# Patient Record
Sex: MALE | Race: ASIAN | NOT HISPANIC OR LATINO
[De-identification: names, ages, dates, MRNs, and addresses within clinical notes are randomized per-mention and may not be internally consistent; named-entity substitution may affect disease eponyms.]

---

## 2017-02-21 PROBLEM — Z00.00 ENCOUNTER FOR PREVENTIVE HEALTH EXAMINATION: Status: ACTIVE | Noted: 2017-02-21

## 2017-03-22 ENCOUNTER — APPOINTMENT (OUTPATIENT)
Dept: OPHTHALMOLOGY | Facility: CLINIC | Age: 37
End: 2017-03-22

## 2017-09-20 ENCOUNTER — APPOINTMENT (OUTPATIENT)
Dept: OPHTHALMOLOGY | Facility: CLINIC | Age: 37
End: 2017-09-20
Payer: MEDICAID

## 2017-09-20 PROCEDURE — 92015 DETERMINE REFRACTIVE STATE: CPT

## 2017-09-20 PROCEDURE — 92226: CPT | Mod: RT

## 2017-09-20 PROCEDURE — 92286 ANT SGM IMG I&R SPECLR MIC: CPT

## 2017-09-20 PROCEDURE — 92014 COMPRE OPH EXAM EST PT 1/>: CPT

## 2017-12-20 ENCOUNTER — APPOINTMENT (OUTPATIENT)
Dept: OPHTHALMOLOGY | Facility: CLINIC | Age: 37
End: 2017-12-20
Payer: MEDICAID

## 2017-12-20 PROCEDURE — 76514 ECHO EXAM OF EYE THICKNESS: CPT

## 2017-12-20 PROCEDURE — 92133 CPTRZD OPH DX IMG PST SGM ON: CPT

## 2017-12-20 PROCEDURE — 92083 EXTENDED VISUAL FIELD XM: CPT

## 2017-12-20 PROCEDURE — 92012 INTRM OPH EXAM EST PATIENT: CPT

## 2018-06-20 ENCOUNTER — APPOINTMENT (OUTPATIENT)
Dept: OPHTHALMOLOGY | Facility: CLINIC | Age: 38
End: 2018-06-20
Payer: MEDICAID

## 2018-06-20 PROCEDURE — 92014 COMPRE OPH EXAM EST PT 1/>: CPT

## 2018-06-20 PROCEDURE — 92226: CPT | Mod: LT

## 2018-06-20 PROCEDURE — 92134 CPTRZ OPH DX IMG PST SGM RTA: CPT

## 2018-07-30 ENCOUNTER — APPOINTMENT (OUTPATIENT)
Dept: VASCULAR SURGERY | Facility: CLINIC | Age: 38
End: 2018-07-30
Payer: MEDICAID

## 2018-07-30 VITALS
WEIGHT: 189 LBS | DIASTOLIC BLOOD PRESSURE: 87 MMHG | HEIGHT: 66 IN | SYSTOLIC BLOOD PRESSURE: 159 MMHG | BODY MASS INDEX: 30.37 KG/M2 | HEART RATE: 93 BPM

## 2018-07-30 DIAGNOSIS — Z83.3 FAMILY HISTORY OF DIABETES MELLITUS: ICD-10-CM

## 2018-07-30 DIAGNOSIS — Z86.2 PERSONAL HISTORY OF DISEASES OF THE BLOOD AND BLOOD-FORMING ORGANS AND CERTAIN DISORDERS INVOLVING THE IMMUNE MECHANISM: ICD-10-CM

## 2018-07-30 DIAGNOSIS — Z71.9 COUNSELING, UNSPECIFIED: ICD-10-CM

## 2018-07-30 DIAGNOSIS — Z84.1 FAMILY HISTORY OF DISORDERS OF KIDNEY AND URETER: ICD-10-CM

## 2018-07-30 DIAGNOSIS — Z82.49 FAMILY HISTORY OF ISCHEMIC HEART DISEASE AND OTHER DISEASES OF THE CIRCULATORY SYSTEM: ICD-10-CM

## 2018-07-30 DIAGNOSIS — E11.22 TYPE 2 DIABETES MELLITUS WITH DIABETIC CHRONIC KIDNEY DISEASE: ICD-10-CM

## 2018-07-30 DIAGNOSIS — Z87.891 PERSONAL HISTORY OF NICOTINE DEPENDENCE: ICD-10-CM

## 2018-07-30 DIAGNOSIS — N18.9 CHRONIC KIDNEY DISEASE, UNSPECIFIED: ICD-10-CM

## 2018-07-30 DIAGNOSIS — E11.311 TYPE 2 DIABETES MELLITUS WITH UNSPECIFIED DIABETIC RETINOPATHY WITH MACULAR EDEMA: ICD-10-CM

## 2018-07-30 DIAGNOSIS — Z86.39 PERSONAL HISTORY OF OTHER ENDOCRINE, NUTRITIONAL AND METABOLIC DISEASE: ICD-10-CM

## 2018-07-30 DIAGNOSIS — Z86.79 PERSONAL HISTORY OF OTHER DISEASES OF THE CIRCULATORY SYSTEM: ICD-10-CM

## 2018-07-30 DIAGNOSIS — N18.4 TYPE 2 DIABETES MELLITUS WITH DIABETIC CHRONIC KIDNEY DISEASE: ICD-10-CM

## 2018-07-30 PROCEDURE — 93971 EXTREMITY STUDY: CPT

## 2018-07-30 PROCEDURE — 99204 OFFICE O/P NEW MOD 45 MIN: CPT

## 2018-07-30 PROCEDURE — G0365: CPT

## 2018-07-30 RX ORDER — FERROUS SULFATE 325(65) MG
325 TABLET ORAL
Refills: 0 | Status: ACTIVE | COMMUNITY

## 2018-07-30 RX ORDER — FUROSEMIDE 40 MG/1
40 TABLET ORAL
Refills: 0 | Status: ACTIVE | COMMUNITY

## 2018-07-30 RX ORDER — ALLOPURINOL 100 MG/1
100 TABLET ORAL
Refills: 0 | Status: ACTIVE | COMMUNITY

## 2018-07-30 RX ORDER — INSULIN GLULISINE 100 [IU]/ML
100 INJECTION, SOLUTION SUBCUTANEOUS
Refills: 0 | Status: ACTIVE | COMMUNITY

## 2018-07-30 RX ORDER — INSULIN DEGLUDEC INJECTION 200 U/ML
200 INJECTION, SOLUTION SUBCUTANEOUS
Refills: 0 | Status: ACTIVE | COMMUNITY

## 2018-07-30 RX ORDER — ATORVASTATIN CALCIUM 40 MG/1
40 TABLET, FILM COATED ORAL
Refills: 0 | Status: ACTIVE | COMMUNITY

## 2018-07-30 RX ORDER — AMLODIPINE BESYLATE 10 MG/1
10 TABLET ORAL
Refills: 0 | Status: ACTIVE | COMMUNITY

## 2018-07-30 RX ORDER — HYDRALAZINE HYDROCHLORIDE 50 MG/1
50 TABLET ORAL
Refills: 0 | Status: ACTIVE | COMMUNITY

## 2018-08-20 ENCOUNTER — OUTPATIENT (OUTPATIENT)
Dept: OUTPATIENT SERVICES | Facility: HOSPITAL | Age: 38
LOS: 1 days | End: 2018-08-20
Payer: MEDICAID

## 2018-08-20 VITALS
HEART RATE: 85 BPM | RESPIRATION RATE: 18 BRPM | TEMPERATURE: 98 F | SYSTOLIC BLOOD PRESSURE: 141 MMHG | DIASTOLIC BLOOD PRESSURE: 82 MMHG | WEIGHT: 182.98 LBS | HEIGHT: 66 IN | OXYGEN SATURATION: 100 %

## 2018-08-20 DIAGNOSIS — E11.9 TYPE 2 DIABETES MELLITUS WITHOUT COMPLICATIONS: Chronic | ICD-10-CM

## 2018-08-20 DIAGNOSIS — Z98.42 CATARACT EXTRACTION STATUS, LEFT EYE: Chronic | ICD-10-CM

## 2018-08-20 DIAGNOSIS — N18.5 CHRONIC KIDNEY DISEASE, STAGE 5: ICD-10-CM

## 2018-08-20 DIAGNOSIS — E11.9 TYPE 2 DIABETES MELLITUS WITHOUT COMPLICATIONS: ICD-10-CM

## 2018-08-20 DIAGNOSIS — H33.22 SEROUS RETINAL DETACHMENT, LEFT EYE: Chronic | ICD-10-CM

## 2018-08-20 DIAGNOSIS — N20.0 CALCULUS OF KIDNEY: Chronic | ICD-10-CM

## 2018-08-20 DIAGNOSIS — N18.9 CHRONIC KIDNEY DISEASE, UNSPECIFIED: ICD-10-CM

## 2018-08-20 DIAGNOSIS — I10 ESSENTIAL (PRIMARY) HYPERTENSION: ICD-10-CM

## 2018-08-20 PROCEDURE — G0463: CPT

## 2018-08-20 RX ORDER — SODIUM CHLORIDE 9 MG/ML
3 INJECTION INTRAMUSCULAR; INTRAVENOUS; SUBCUTANEOUS EVERY 8 HOURS
Qty: 0 | Refills: 0 | Status: DISCONTINUED | OUTPATIENT
Start: 2018-08-30 | End: 2018-09-07

## 2018-08-20 NOTE — H&P PST ADULT - PROBLEM SELECTOR PLAN 2
Continue antihypertensive meds and take with sips of water on day of surgery.  Cleared by PCP.  Follow-up with PCP postop for management.

## 2018-08-20 NOTE — H&P PST ADULT - PMH
DM (diabetes mellitus)    HLD (hyperlipidemia)    HTN (hypertension)    Hyperuricemia    Seasonal allergies    Stage 5 chronic kidney disease not on chronic dialysis Anemia of chronic disease    Diabetic macular edema    DM (diabetes mellitus)    HLD (hyperlipidemia)    HTN (hypertension)    Hyperuricemia    Obesity    Seasonal allergies    Stage 5 chronic kidney disease not on chronic dialysis    Subclinical hypothyroidism    TIA (transient ischemic attack)

## 2018-08-20 NOTE — H&P PST ADULT - NSANTHOSAYNRD_GEN_A_CORE
No. SPIKE screening performed.  STOP BANG Legend: 0-2 = LOW Risk; 3-4 = INTERMEDIATE Risk; 5-8 = HIGH Risk

## 2018-08-20 NOTE — H&P PST ADULT - ASSESSMENT
37 yr old male with PMH of HTN, Hyperlipidemia, DM, diabetic neuropathy and retinopathy, obesity, TIA, subclinical hypothyroidism, anemia of chronic disease, hyperuricemia, chronic kidney disease stage 5 presents for right upper radiocephalic arteriovenous fistula creation on 8/30/2018 in anticipation of hemodialysis.

## 2018-08-20 NOTE — H&P PST ADULT - VENOUS THROMBOEMBOLISM BMI
Myself and Dr Self at bedside explaining to the patient about prophylactic medications and possible adverse reactions. Patient verbalizes understanding and denies any questions. The patient is agreeable to having prophylactic medications.      Rosie Brock RN  08/16/18 2640     30 or less

## 2018-08-20 NOTE — H&P PST ADULT - NEGATIVE CARDIOVASCULAR SYMPTOMS
no paroxysmal nocturnal dyspnea/no claudication/no dyspnea on exertion/no palpitations/no orthopnea/no chest pain

## 2018-08-20 NOTE — H&P PST ADULT - RS GEN PE MLT RESP DETAILS PC
no wheezes/clear to auscultation bilaterally/no rhonchi/airway patent/respirations non-labored/normal/breath sounds equal/no chest wall tenderness/no intercostal retractions/no subcutaneous emphysema/no rales/good air movement

## 2018-08-20 NOTE — H&P PST ADULT - PROBLEM SELECTOR PLAN 1
right upper radiocephalic arteriovenous fistula creation on 8/30/2018 in anticipation of hemodialysis.

## 2018-08-20 NOTE — H&P PST ADULT - FAMILY HISTORY
Father  Still living? Unknown  Family history of diabetes mellitus (DM), Age at diagnosis: Age Unknown  Family history of hypertension, Age at diagnosis: Age Unknown  Family history of hyperlipidemia, Age at diagnosis: Age Unknown     Mother  Still living? Unknown  Family history of diabetes mellitus (DM), Age at diagnosis: Age Unknown  Family history of hypertension, Age at diagnosis: Age Unknown  Family history of hyperlipidemia, Age at diagnosis: Age Unknown     Aunt  Still living? No  Family history of diabetes mellitus (DM), Age at diagnosis: Age Unknown  Family history of breast cancer, Age at diagnosis: Age Unknown     Uncle  Still living? Yes, Estimated age: Age Unknown  Family history of diabetes mellitus (DM), Age at diagnosis: Age Unknown     Sibling  Still living? Yes, Estimated age: Age Unknown  Family history of hypertension, Age at diagnosis: Age Unknown  Family history of hyperlipidemia, Age at diagnosis: Age Unknown     Grandparent  Still living? No  Family history of breast cancer, Age at diagnosis: Age Unknown

## 2018-08-20 NOTE — H&P PST ADULT - NEGATIVE GENERAL GENITOURINARY SYMPTOMS
no urine discoloration/no urinary hesitancy/no nocturia/no bladder infections/no dysuria/normal urinary frequency

## 2018-08-20 NOTE — H&P PST ADULT - PSH
Detached retina, left  repair of retina  History of left cataract extraction    Renal stone  insertion of left ureteral stone

## 2018-08-20 NOTE — H&P PST ADULT - NEGATIVE OPHTHALMOLOGIC SYMPTOMS
no discharge L/no pain R/no irritation L/no irritation R/no lacrimation L/no lacrimation R/no pain L/no discharge R/no diplopia/no photophobia

## 2018-08-20 NOTE — H&P PST ADULT - PROBLEM SELECTOR PLAN 3
Continue antidiabetic meds and hold on day of surgery. Follow-up with PCP postop for diabetic management.

## 2018-08-29 ENCOUNTER — TRANSCRIPTION ENCOUNTER (OUTPATIENT)
Age: 38
End: 2018-08-29

## 2018-08-30 ENCOUNTER — APPOINTMENT (OUTPATIENT)
Dept: VASCULAR SURGERY | Facility: HOSPITAL | Age: 38
End: 2018-08-30

## 2018-08-30 ENCOUNTER — OUTPATIENT (OUTPATIENT)
Dept: OUTPATIENT SERVICES | Facility: HOSPITAL | Age: 38
LOS: 1 days | End: 2018-08-30
Payer: MEDICAID

## 2018-08-30 VITALS
DIASTOLIC BLOOD PRESSURE: 77 MMHG | HEIGHT: 66 IN | WEIGHT: 182.98 LBS | SYSTOLIC BLOOD PRESSURE: 123 MMHG | OXYGEN SATURATION: 95 % | HEART RATE: 97 BPM | RESPIRATION RATE: 16 BRPM | TEMPERATURE: 98 F

## 2018-08-30 VITALS
TEMPERATURE: 98 F | RESPIRATION RATE: 14 BRPM | HEART RATE: 92 BPM | OXYGEN SATURATION: 97 % | DIASTOLIC BLOOD PRESSURE: 62 MMHG | SYSTOLIC BLOOD PRESSURE: 134 MMHG

## 2018-08-30 DIAGNOSIS — Z98.42 CATARACT EXTRACTION STATUS, LEFT EYE: Chronic | ICD-10-CM

## 2018-08-30 DIAGNOSIS — N18.9 CHRONIC KIDNEY DISEASE, UNSPECIFIED: ICD-10-CM

## 2018-08-30 DIAGNOSIS — H33.22 SEROUS RETINAL DETACHMENT, LEFT EYE: Chronic | ICD-10-CM

## 2018-08-30 DIAGNOSIS — N20.0 CALCULUS OF KIDNEY: Chronic | ICD-10-CM

## 2018-08-30 LAB
GLUCOSE BLDC GLUCOMTR-MCNC: 104 MG/DL — HIGH (ref 70–99)
GLUCOSE BLDC GLUCOMTR-MCNC: 105 MG/DL — HIGH (ref 70–99)

## 2018-08-30 PROCEDURE — 35321 RECHANNELING OF ARTERY: CPT

## 2018-08-30 PROCEDURE — 82962 GLUCOSE BLOOD TEST: CPT

## 2018-08-30 PROCEDURE — 36820 AV FUSION/FOREARM VEIN: CPT | Mod: 59

## 2018-08-30 PROCEDURE — 36821 AV FUSION DIRECT ANY SITE: CPT | Mod: RT

## 2018-08-30 RX ADMIN — SODIUM CHLORIDE 3 MILLILITER(S): 9 INJECTION INTRAMUSCULAR; INTRAVENOUS; SUBCUTANEOUS at 07:09

## 2018-08-30 NOTE — ASU DISCHARGE PLAN (ADULT/PEDIATRIC). - MEDICATION SUMMARY - MEDICATIONS TO TAKE
I will START or STAY ON the medications listed below when I get home from the hospital:    oxyCODONE-acetaminophen 5 mg-325 mg oral tablet  -- 1 tab(s) by mouth every 6 hours MDD:4  -- Caution federal law prohibits the transfer of this drug to any person other  than the person for whom it was prescribed.  May cause drowsiness.  Alcohol may intensify this effect.  Use care when operating dangerous machinery.  This prescription cannot be refilled.  This product contains acetaminophen.  Do not use  with any other product containing acetaminophen to prevent possible liver damage.  Using more of this medication than prescribed may cause serious breathing problems.    -- Indication: For Pain management    Apidra SoloStar Pen 100 units/mL injectable solution  -- 4-10 unit(s) injectable 3 times a day (before meals)  -- Indication: For Home medication    Tresiba FlexTouch 200 units/mL subcutaneous solution  -- 30 unit(s) subcutaneous once a day (at bedtime)  -- Indication: For Home medication    allopurinol 100 mg oral tablet  -- 1 tab(s) by mouth once a day  -- Indication: For Home medication    Claritin 10 mg oral tablet  -- 1 tab(s) by mouth once a day, As Needed  -- Indication: For Home medication    atorvastatin 40 mg oral tablet  -- 1 tab(s) by mouth once a day  -- Indication: For Home medication    amLODIPine 10 mg oral tablet  -- 1 tab(s) by mouth once a day  -- Indication: For Home medication    furosemide 40 mg oral tablet  -- 1 tab(s) by mouth once a day  -- Indication: For Home medication    ferrous sulfate 325 mg (65 mg elemental iron) oral tablet  -- 1 tab(s) by mouth once a day  -- Indication: For Home medication    Tears Again preserved ophthalmic solution  -- 1 drop(s) to each affected eye 2 times a day  -- Indication: For Home medication    hydrALAZINE 50 mg oral tablet  -- 1 tab(s) by mouth 2 times a day  -- Indication: For Home medication

## 2018-08-30 NOTE — BRIEF OPERATIVE NOTE - PROCEDURE
<<-----Click on this checkbox to enter Procedure Creation of arteriovenous fistula in right upper extremity  08/30/2018    Active  ARMAND

## 2018-08-30 NOTE — ASU PATIENT PROFILE, ADULT - PMH
Anemia of chronic disease    Diabetic macular edema    DM (diabetes mellitus)    HLD (hyperlipidemia)    HTN (hypertension)    Hyperuricemia    Obesity    Seasonal allergies    Stage 5 chronic kidney disease not on chronic dialysis    Subclinical hypothyroidism    TIA (transient ischemic attack)

## 2018-08-30 NOTE — ASU PATIENT PROFILE, ADULT - VISION (WITH CORRECTIVE LENSES IF THE PATIENT USUALLY WEARS THEM):
left eye retina detachment/Partially impaired: cannot see medication labels or newsprint, but can see obstacles in path, and the surrounding layout; can count fingers at arm's length

## 2018-08-30 NOTE — ASU DISCHARGE PLAN (ADULT/PEDIATRIC). - ACTIVITY LEVEL
Regular activity as tolerated no sports/gym/Regular activity as tolerated/no heavy lifting/no exercise

## 2018-09-17 ENCOUNTER — APPOINTMENT (OUTPATIENT)
Dept: VASCULAR SURGERY | Facility: CLINIC | Age: 38
End: 2018-09-17
Payer: MEDICAID

## 2018-09-17 PROBLEM — E79.0 HYPERURICEMIA WITHOUT SIGNS OF INFLAMMATORY ARTHRITIS AND TOPHACEOUS DISEASE: Chronic | Status: ACTIVE | Noted: 2018-08-20

## 2018-09-17 PROBLEM — D63.8 ANEMIA IN OTHER CHRONIC DISEASES CLASSIFIED ELSEWHERE: Chronic | Status: ACTIVE | Noted: 2018-08-20

## 2018-09-17 PROBLEM — E66.9 OBESITY, UNSPECIFIED: Chronic | Status: ACTIVE | Noted: 2018-08-20

## 2018-09-17 PROBLEM — J30.2 OTHER SEASONAL ALLERGIC RHINITIS: Chronic | Status: ACTIVE | Noted: 2018-08-20

## 2018-09-17 PROBLEM — E03.9 HYPOTHYROIDISM, UNSPECIFIED: Chronic | Status: ACTIVE | Noted: 2018-08-20

## 2018-09-17 PROBLEM — G45.9 TRANSIENT CEREBRAL ISCHEMIC ATTACK, UNSPECIFIED: Chronic | Status: ACTIVE | Noted: 2018-08-20

## 2018-09-17 PROBLEM — E11.9 TYPE 2 DIABETES MELLITUS WITHOUT COMPLICATIONS: Chronic | Status: ACTIVE | Noted: 2018-08-20

## 2018-09-17 PROBLEM — E11.311 TYPE 2 DIABETES MELLITUS WITH UNSPECIFIED DIABETIC RETINOPATHY WITH MACULAR EDEMA: Chronic | Status: ACTIVE | Noted: 2018-08-20

## 2018-09-17 PROBLEM — E78.5 HYPERLIPIDEMIA, UNSPECIFIED: Chronic | Status: ACTIVE | Noted: 2018-08-20

## 2018-09-17 PROBLEM — N18.5 CHRONIC KIDNEY DISEASE, STAGE 5: Chronic | Status: ACTIVE | Noted: 2018-08-20

## 2018-09-17 PROBLEM — I10 ESSENTIAL (PRIMARY) HYPERTENSION: Chronic | Status: ACTIVE | Noted: 2018-08-20

## 2018-09-17 PROCEDURE — 93970 EXTREMITY STUDY: CPT

## 2018-09-17 PROCEDURE — 99024 POSTOP FOLLOW-UP VISIT: CPT

## 2018-09-24 ENCOUNTER — TRANSCRIPTION ENCOUNTER (OUTPATIENT)
Age: 38
End: 2018-09-24

## 2018-10-02 ENCOUNTER — FORM ENCOUNTER (OUTPATIENT)
Age: 38
End: 2018-10-02

## 2018-10-03 ENCOUNTER — APPOINTMENT (OUTPATIENT)
Dept: ENDOVASCULAR SURGERY | Facility: CLINIC | Age: 38
End: 2018-10-03
Payer: MEDICAID

## 2018-10-03 PROCEDURE — 36011Z: CUSTOM | Mod: 59

## 2018-10-03 PROCEDURE — 36902Z: CUSTOM | Mod: 78,59

## 2018-10-03 PROCEDURE — 36215Z: CUSTOM | Mod: 78,59

## 2018-10-03 PROCEDURE — 36909Z: CUSTOM

## 2018-10-23 ENCOUNTER — FORM ENCOUNTER (OUTPATIENT)
Age: 38
End: 2018-10-23

## 2018-10-24 ENCOUNTER — APPOINTMENT (OUTPATIENT)
Dept: ENDOVASCULAR SURGERY | Facility: CLINIC | Age: 38
End: 2018-10-24
Payer: MEDICAID

## 2018-10-24 PROCEDURE — 36909Z: CUSTOM

## 2018-10-24 PROCEDURE — 36215Z: CUSTOM | Mod: 78

## 2018-10-24 PROCEDURE — 36011Z: CUSTOM | Mod: RT,59

## 2018-10-24 PROCEDURE — 36902Z: CUSTOM | Mod: 78

## 2018-10-24 PROCEDURE — 76937 US GUIDE VASCULAR ACCESS: CPT

## 2018-11-06 ENCOUNTER — FORM ENCOUNTER (OUTPATIENT)
Age: 38
End: 2018-11-06

## 2018-11-07 ENCOUNTER — APPOINTMENT (OUTPATIENT)
Dept: ENDOVASCULAR SURGERY | Facility: CLINIC | Age: 38
End: 2018-11-07
Payer: MEDICAID

## 2018-11-07 PROCEDURE — 36011Z: CUSTOM | Mod: 59

## 2018-11-07 PROCEDURE — 36215Z: CUSTOM | Mod: 78

## 2018-11-07 PROCEDURE — 36909Z: CUSTOM

## 2018-11-07 PROCEDURE — 36902Z: CUSTOM | Mod: 78

## 2018-11-21 ENCOUNTER — APPOINTMENT (OUTPATIENT)
Dept: ENDOVASCULAR SURGERY | Facility: CLINIC | Age: 38
End: 2018-11-21
Payer: MEDICAID

## 2018-11-21 PROCEDURE — 93990 DOPPLER FLOW TESTING: CPT

## 2018-12-12 ENCOUNTER — APPOINTMENT (OUTPATIENT)
Dept: ENDOVASCULAR SURGERY | Facility: CLINIC | Age: 38
End: 2018-12-12
Payer: MEDICAID

## 2018-12-12 PROCEDURE — 36589 REMOVAL TUNNELED CV CATH: CPT

## 2018-12-19 ENCOUNTER — APPOINTMENT (OUTPATIENT)
Dept: OPHTHALMOLOGY | Facility: CLINIC | Age: 38
End: 2018-12-19
Payer: MEDICAID

## 2018-12-19 PROCEDURE — 92083 EXTENDED VISUAL FIELD XM: CPT

## 2018-12-19 PROCEDURE — 92133 CPTRZD OPH DX IMG PST SGM ON: CPT

## 2018-12-19 PROCEDURE — ZZZZZ: CPT

## 2018-12-19 PROCEDURE — 92226: CPT | Mod: LT

## 2018-12-19 PROCEDURE — 92014 COMPRE OPH EXAM EST PT 1/>: CPT

## 2019-02-11 ENCOUNTER — APPOINTMENT (OUTPATIENT)
Dept: VASCULAR SURGERY | Facility: CLINIC | Age: 39
End: 2019-02-11
Payer: MEDICARE

## 2019-02-11 PROCEDURE — 99213 OFFICE O/P EST LOW 20 MIN: CPT

## 2019-02-11 PROCEDURE — 93990 DOPPLER FLOW TESTING: CPT

## 2019-02-15 NOTE — DISCUSSION/SUMMARY
[FreeTextEntry1] : 39 yo male with history of esrd on hd presents for follow up \par duplex shows patent avf with flow rate of 735\par given no difficulty with hd will continue to monitor pt to follow up in 3 months with repeat duplex

## 2019-02-15 NOTE — PHYSICAL EXAM
[Thrill] : thrill [Hand well perfused] : hand well perfused [Warm Extremities] : warm extremities [2+] : left 2+ [Pulsatile Thrill] : no pulsatile thrill [Aneurysm] : no aneurysm [Bleeding] : no bleeding [Ulcer] : no ulcer [Gangrene] : no gangrene [Normal] : normoactive bowel sounds, soft and nontender, no hepatosplenomegaly or masses appreciated [de-identified] :  strength 5/5 b/l upper extremities [de-identified] : intact

## 2019-02-15 NOTE — HISTORY OF PRESENT ILLNESS
[] : right radiocephalic fistula [FreeTextEntry1] : 37 yo male with history of esrd on hd presents for follow up of left upper extremity avf without any complaints.  pt has been using avf for access and denies any difficulty with hd.  pt denies any bleeding or difficult canulation

## 2019-05-13 ENCOUNTER — APPOINTMENT (OUTPATIENT)
Dept: VASCULAR SURGERY | Facility: CLINIC | Age: 39
End: 2019-05-13
Payer: SELF-PAY

## 2019-05-13 VITALS
SYSTOLIC BLOOD PRESSURE: 164 MMHG | HEIGHT: 66 IN | WEIGHT: 175 LBS | BODY MASS INDEX: 28.12 KG/M2 | DIASTOLIC BLOOD PRESSURE: 95 MMHG | TEMPERATURE: 98 F | HEART RATE: 96 BPM

## 2019-05-13 DIAGNOSIS — I77.0 ARTERIOVENOUS FISTULA, ACQUIRED: ICD-10-CM

## 2019-05-13 PROCEDURE — 93990 DOPPLER FLOW TESTING: CPT

## 2019-05-13 PROCEDURE — 99213 OFFICE O/P EST LOW 20 MIN: CPT

## 2019-05-13 NOTE — PHYSICAL EXAM
[Thrill] : thrill [Pulsatile Thrill] : no pulsatile thrill [Aneurysm] : no aneurysm [Bleeding] : no bleeding [2+] : left 2+ [Normal] : normoactive bowel sounds, soft and nontender, no hepatosplenomegaly or masses appreciated

## 2019-08-12 ENCOUNTER — APPOINTMENT (OUTPATIENT)
Dept: VASCULAR SURGERY | Facility: CLINIC | Age: 39
End: 2019-08-12
Payer: MEDICARE

## 2019-08-12 PROCEDURE — 93990 DOPPLER FLOW TESTING: CPT

## 2019-08-12 PROCEDURE — 99213 OFFICE O/P EST LOW 20 MIN: CPT

## 2019-08-12 NOTE — ASSESSMENT
[FreeTextEntry1] : Patient with renal failure on hemodialysis. AV fistula with mild to moderate stenosis with no difficulty on dialysis . Continue conservative management with followup in 3 months

## 2019-11-11 ENCOUNTER — APPOINTMENT (OUTPATIENT)
Dept: VASCULAR SURGERY | Facility: CLINIC | Age: 39
End: 2019-11-11

## 2019-12-17 ENCOUNTER — FORM ENCOUNTER (OUTPATIENT)
Age: 39
End: 2019-12-17

## 2019-12-18 ENCOUNTER — APPOINTMENT (OUTPATIENT)
Dept: ENDOVASCULAR SURGERY | Facility: CLINIC | Age: 39
End: 2019-12-18
Payer: MEDICARE

## 2019-12-18 VITALS
WEIGHT: 79.7 LBS | TEMPERATURE: 99 F | OXYGEN SATURATION: 97 % | RESPIRATION RATE: 18 BRPM | HEART RATE: 107 BPM | BODY MASS INDEX: 12.86 KG/M2

## 2019-12-18 VITALS — DIASTOLIC BLOOD PRESSURE: 90 MMHG | SYSTOLIC BLOOD PRESSURE: 169 MMHG

## 2019-12-18 VITALS — HEART RATE: 104 BPM | OXYGEN SATURATION: 97 %

## 2019-12-18 PROCEDURE — 36902Z: CUSTOM

## 2019-12-18 PROCEDURE — 36215Z: CUSTOM | Mod: 59

## 2019-12-20 NOTE — PROCEDURE
[Site check for bleeding/hematoma/thrill/bruit] : Site check for bleeding/hematoma/thrill/bruit [Resume diet] : resume diet [Vital signs on admission the q 15 mins x2] : Vital signs on admission the q 15 mins x2 [FreeTextEntry1] : right arm fistula fistulogram/angioplasty

## 2019-12-20 NOTE — HISTORY OF PRESENT ILLNESS
[] : right radiocephalic fistula [FreeTextEntry1] : 8/30/2018 Dr. Balderrama [FreeTextEntry4] : yesterday [FreeTextEntry5] : 2Am [FreeTextEntry6] : Dr. Abdalla

## 2019-12-20 NOTE — PAST MEDICAL HISTORY
[No therapy indicated for cases scheduled for less than one hour] : No therapy indicated for cases scheduled for less than one hour. [Increasing age ( >40 years old)] : Increasing age ( >40 years old) [FreeTextEntry1] : Malignant Hyperthermia Screening Tool and Risk of Bleeding Assessment\par \par Mr. XAVI MEZA denies family history of unexpected death following Anesthesia or Exercise.\par Denies Family history of Malignant Hyperthermia, Muscle or Neuromuscular disorder and High Temperature following exercise.\par \par Mr. XAVI MEZA denies history of Muscle Spasm, Dark or Chocolate - Colored urine and Unanticipated fever immediately following anesthesia or serious exercise. \par Mr. MEZA also denies bleeding tendencies/ Risks of Bleeding.\par

## 2020-03-09 ENCOUNTER — NON-APPOINTMENT (OUTPATIENT)
Age: 40
End: 2020-03-09

## 2020-03-09 ENCOUNTER — APPOINTMENT (OUTPATIENT)
Dept: OPHTHALMOLOGY | Facility: CLINIC | Age: 40
End: 2020-03-09
Payer: MEDICARE

## 2020-03-09 PROCEDURE — 92134 CPTRZ OPH DX IMG PST SGM RTA: CPT

## 2020-03-09 PROCEDURE — 92014 COMPRE OPH EXAM EST PT 1/>: CPT

## 2020-03-16 ENCOUNTER — APPOINTMENT (OUTPATIENT)
Dept: VASCULAR SURGERY | Facility: CLINIC | Age: 40
End: 2020-03-16

## 2021-02-04 ENCOUNTER — INPATIENT (INPATIENT)
Facility: HOSPITAL | Age: 41
LOS: 0 days | Discharge: HOME | End: 2021-02-05
Attending: INTERNAL MEDICINE | Admitting: INTERNAL MEDICINE
Payer: MEDICARE

## 2021-02-04 VITALS
TEMPERATURE: 99 F | HEIGHT: 66 IN | DIASTOLIC BLOOD PRESSURE: 114 MMHG | HEART RATE: 102 BPM | SYSTOLIC BLOOD PRESSURE: 193 MMHG | OXYGEN SATURATION: 98 % | RESPIRATION RATE: 12 BRPM

## 2021-02-04 DIAGNOSIS — N18.6 END STAGE RENAL DISEASE: ICD-10-CM

## 2021-02-04 DIAGNOSIS — Z98.42 CATARACT EXTRACTION STATUS, LEFT EYE: Chronic | ICD-10-CM

## 2021-02-04 DIAGNOSIS — I10 ESSENTIAL (PRIMARY) HYPERTENSION: ICD-10-CM

## 2021-02-04 DIAGNOSIS — R11.2 NAUSEA WITH VOMITING, UNSPECIFIED: ICD-10-CM

## 2021-02-04 DIAGNOSIS — H33.22 SEROUS RETINAL DETACHMENT, LEFT EYE: Chronic | ICD-10-CM

## 2021-02-04 DIAGNOSIS — Z29.9 ENCOUNTER FOR PROPHYLACTIC MEASURES, UNSPECIFIED: ICD-10-CM

## 2021-02-04 DIAGNOSIS — E11.65 TYPE 2 DIABETES MELLITUS WITH HYPERGLYCEMIA: ICD-10-CM

## 2021-02-04 DIAGNOSIS — N20.0 CALCULUS OF KIDNEY: Chronic | ICD-10-CM

## 2021-02-04 DIAGNOSIS — E87.5 HYPERKALEMIA: ICD-10-CM

## 2021-02-04 LAB
ALBUMIN SERPL ELPH-MCNC: 4.4 G/DL — SIGNIFICANT CHANGE UP (ref 3.3–5)
ALP SERPL-CCNC: 34 U/L — LOW (ref 40–120)
ALT FLD-CCNC: 11 U/L — SIGNIFICANT CHANGE UP (ref 4–41)
ANION GAP SERPL CALC-SCNC: 21 MMOL/L — HIGH (ref 7–14)
ANION GAP SERPL CALC-SCNC: 23 MMOL/L — HIGH (ref 7–14)
AST SERPL-CCNC: 41 U/L — HIGH (ref 4–40)
BASOPHILS # BLD AUTO: 0.05 K/UL — SIGNIFICANT CHANGE UP (ref 0–0.2)
BASOPHILS NFR BLD AUTO: 0.6 % — SIGNIFICANT CHANGE UP (ref 0–2)
BILIRUB SERPL-MCNC: 0.3 MG/DL — SIGNIFICANT CHANGE UP (ref 0.2–1.2)
BLOOD GAS VENOUS COMPREHENSIVE RESULT: SIGNIFICANT CHANGE UP
BUN SERPL-MCNC: 89 MG/DL — HIGH (ref 7–23)
BUN SERPL-MCNC: 91 MG/DL — HIGH (ref 7–23)
CALCIUM SERPL-MCNC: 8.9 MG/DL — SIGNIFICANT CHANGE UP (ref 8.4–10.5)
CALCIUM SERPL-MCNC: 9.1 MG/DL — SIGNIFICANT CHANGE UP (ref 8.4–10.5)
CHLORIDE SERPL-SCNC: 94 MMOL/L — LOW (ref 98–107)
CHLORIDE SERPL-SCNC: 94 MMOL/L — LOW (ref 98–107)
CO2 SERPL-SCNC: 19 MMOL/L — LOW (ref 22–31)
CO2 SERPL-SCNC: 19 MMOL/L — LOW (ref 22–31)
CREAT SERPL-MCNC: 17.75 MG/DL — HIGH (ref 0.5–1.3)
CREAT SERPL-MCNC: 18.17 MG/DL — HIGH (ref 0.5–1.3)
DIALYSIS INSTRUMENT RESULT - HEPATITIS B SURFACE ANTIGEN: NEGATIVE — SIGNIFICANT CHANGE UP
EOSINOPHIL # BLD AUTO: 0.3 K/UL — SIGNIFICANT CHANGE UP (ref 0–0.5)
EOSINOPHIL NFR BLD AUTO: 3.5 % — SIGNIFICANT CHANGE UP (ref 0–6)
GLUCOSE BLDC GLUCOMTR-MCNC: 126 MG/DL — HIGH (ref 70–99)
GLUCOSE BLDC GLUCOMTR-MCNC: 129 MG/DL — HIGH (ref 70–99)
GLUCOSE BLDC GLUCOMTR-MCNC: 87 MG/DL — SIGNIFICANT CHANGE UP (ref 70–99)
GLUCOSE BLDC GLUCOMTR-MCNC: 94 MG/DL — SIGNIFICANT CHANGE UP (ref 70–99)
GLUCOSE SERPL-MCNC: 100 MG/DL — HIGH (ref 70–99)
GLUCOSE SERPL-MCNC: 103 MG/DL — HIGH (ref 70–99)
HCT VFR BLD CALC: 28.1 % — LOW (ref 39–50)
HGB BLD-MCNC: 9.5 G/DL — LOW (ref 13–17)
IANC: 5.69 K/UL — SIGNIFICANT CHANGE UP (ref 1.5–8.5)
IMM GRANULOCYTES NFR BLD AUTO: 0.2 % — SIGNIFICANT CHANGE UP (ref 0–1.5)
LYMPHOCYTES # BLD AUTO: 1.65 K/UL — SIGNIFICANT CHANGE UP (ref 1–3.3)
LYMPHOCYTES # BLD AUTO: 19.4 % — SIGNIFICANT CHANGE UP (ref 13–44)
MAGNESIUM SERPL-MCNC: 2.7 MG/DL — HIGH (ref 1.6–2.6)
MCHC RBC-ENTMCNC: 30.4 PG — SIGNIFICANT CHANGE UP (ref 27–34)
MCHC RBC-ENTMCNC: 33.8 GM/DL — SIGNIFICANT CHANGE UP (ref 32–36)
MCV RBC AUTO: 90.1 FL — SIGNIFICANT CHANGE UP (ref 80–100)
MONOCYTES # BLD AUTO: 0.78 K/UL — SIGNIFICANT CHANGE UP (ref 0–0.9)
MONOCYTES NFR BLD AUTO: 9.2 % — SIGNIFICANT CHANGE UP (ref 2–14)
NEUTROPHILS # BLD AUTO: 5.69 K/UL — SIGNIFICANT CHANGE UP (ref 1.8–7.4)
NEUTROPHILS NFR BLD AUTO: 67.1 % — SIGNIFICANT CHANGE UP (ref 43–77)
NRBC # BLD: 0 /100 WBCS — SIGNIFICANT CHANGE UP
NRBC # FLD: 0 K/UL — SIGNIFICANT CHANGE UP
PHOSPHATE SERPL-MCNC: 7.2 MG/DL — HIGH (ref 2.5–4.5)
PLATELET # BLD AUTO: 179 K/UL — SIGNIFICANT CHANGE UP (ref 150–400)
POTASSIUM SERPL-MCNC: 4.4 MMOL/L — SIGNIFICANT CHANGE UP (ref 3.5–5.3)
POTASSIUM SERPL-MCNC: 6.1 MMOL/L — HIGH (ref 3.5–5.3)
POTASSIUM SERPL-SCNC: 4.4 MMOL/L — SIGNIFICANT CHANGE UP (ref 3.5–5.3)
POTASSIUM SERPL-SCNC: 6.1 MMOL/L — HIGH (ref 3.5–5.3)
PROT SERPL-MCNC: 7.7 G/DL — SIGNIFICANT CHANGE UP (ref 6–8.3)
RBC # BLD: 3.12 M/UL — LOW (ref 4.2–5.8)
RBC # FLD: 13.2 % — SIGNIFICANT CHANGE UP (ref 10.3–14.5)
SARS-COV-2 RNA SPEC QL NAA+PROBE: SIGNIFICANT CHANGE UP
SODIUM SERPL-SCNC: 134 MMOL/L — LOW (ref 135–145)
SODIUM SERPL-SCNC: 136 MMOL/L — SIGNIFICANT CHANGE UP (ref 135–145)
WBC # BLD: 8.49 K/UL — SIGNIFICANT CHANGE UP (ref 3.8–10.5)
WBC # FLD AUTO: 8.49 K/UL — SIGNIFICANT CHANGE UP (ref 3.8–10.5)

## 2021-02-04 PROCEDURE — 99223 1ST HOSP IP/OBS HIGH 75: CPT

## 2021-02-04 PROCEDURE — 71046 X-RAY EXAM CHEST 2 VIEWS: CPT | Mod: 26

## 2021-02-04 PROCEDURE — 99284 EMERGENCY DEPT VISIT MOD MDM: CPT

## 2021-02-04 RX ORDER — INSULIN GLULISINE 100 [IU]/ML
10 INJECTION, SOLUTION SUBCUTANEOUS
Qty: 0 | Refills: 0 | DISCHARGE

## 2021-02-04 RX ORDER — INSULIN DEGLUDEC 100 U/ML
30 INJECTION, SOLUTION SUBCUTANEOUS
Qty: 0 | Refills: 0 | DISCHARGE

## 2021-02-04 RX ORDER — HYDRALAZINE HCL 50 MG
50 TABLET ORAL ONCE
Refills: 0 | Status: COMPLETED | OUTPATIENT
Start: 2021-02-04 | End: 2021-02-04

## 2021-02-04 RX ORDER — HYDRALAZINE HCL 50 MG
50 TABLET ORAL
Refills: 0 | Status: DISCONTINUED | OUTPATIENT
Start: 2021-02-04 | End: 2021-02-05

## 2021-02-04 RX ORDER — INSULIN LISPRO 100/ML
VIAL (ML) SUBCUTANEOUS AT BEDTIME
Refills: 0 | Status: DISCONTINUED | OUTPATIENT
Start: 2021-02-04 | End: 2021-02-05

## 2021-02-04 RX ORDER — DEXTROSE 50 % IN WATER 50 %
25 SYRINGE (ML) INTRAVENOUS ONCE
Refills: 0 | Status: DISCONTINUED | OUTPATIENT
Start: 2021-02-04 | End: 2021-02-05

## 2021-02-04 RX ORDER — GLUCAGON INJECTION, SOLUTION 0.5 MG/.1ML
1 INJECTION, SOLUTION SUBCUTANEOUS ONCE
Refills: 0 | Status: DISCONTINUED | OUTPATIENT
Start: 2021-02-04 | End: 2021-02-05

## 2021-02-04 RX ORDER — AMLODIPINE BESYLATE 2.5 MG/1
1 TABLET ORAL
Qty: 0 | Refills: 0 | DISCHARGE

## 2021-02-04 RX ORDER — CALCIUM GLUCONATE 100 MG/ML
1 VIAL (ML) INTRAVENOUS ONCE
Refills: 0 | Status: DISCONTINUED | OUTPATIENT
Start: 2021-02-04 | End: 2021-02-04

## 2021-02-04 RX ORDER — INSULIN LISPRO 100/ML
VIAL (ML) SUBCUTANEOUS
Refills: 0 | Status: DISCONTINUED | OUTPATIENT
Start: 2021-02-04 | End: 2021-02-05

## 2021-02-04 RX ORDER — INSULIN GLARGINE 100 [IU]/ML
20 INJECTION, SOLUTION SUBCUTANEOUS
Qty: 0 | Refills: 0 | DISCHARGE

## 2021-02-04 RX ORDER — AMLODIPINE BESYLATE 2.5 MG/1
10 TABLET ORAL AT BEDTIME
Refills: 0 | Status: DISCONTINUED | OUTPATIENT
Start: 2021-02-04 | End: 2021-02-05

## 2021-02-04 RX ORDER — ALLOPURINOL 300 MG
1 TABLET ORAL
Qty: 0 | Refills: 0 | DISCHARGE

## 2021-02-04 RX ORDER — ATORVASTATIN CALCIUM 80 MG/1
1 TABLET, FILM COATED ORAL
Qty: 0 | Refills: 0 | DISCHARGE

## 2021-02-04 RX ORDER — DEXTROSE 50 % IN WATER 50 %
12.5 SYRINGE (ML) INTRAVENOUS ONCE
Refills: 0 | Status: DISCONTINUED | OUTPATIENT
Start: 2021-02-04 | End: 2021-02-05

## 2021-02-04 RX ORDER — FUROSEMIDE 40 MG
1 TABLET ORAL
Qty: 0 | Refills: 0 | DISCHARGE

## 2021-02-04 RX ORDER — DEXTROSE 50 % IN WATER 50 %
15 SYRINGE (ML) INTRAVENOUS ONCE
Refills: 0 | Status: DISCONTINUED | OUTPATIENT
Start: 2021-02-04 | End: 2021-02-05

## 2021-02-04 RX ORDER — FERROUS SULFATE 325(65) MG
1 TABLET ORAL
Qty: 0 | Refills: 0 | DISCHARGE

## 2021-02-04 RX ORDER — SODIUM POLYSTYRENE SULFONATE 4.1 MEQ/G
15 POWDER, FOR SUSPENSION ORAL ONCE
Refills: 0 | Status: DISCONTINUED | OUTPATIENT
Start: 2021-02-04 | End: 2021-02-04

## 2021-02-04 RX ORDER — INSULIN GLARGINE 100 [IU]/ML
15 INJECTION, SOLUTION SUBCUTANEOUS AT BEDTIME
Refills: 0 | Status: DISCONTINUED | OUTPATIENT
Start: 2021-02-04 | End: 2021-02-05

## 2021-02-04 RX ORDER — LORATADINE 10 MG/1
1 TABLET ORAL
Qty: 0 | Refills: 0 | DISCHARGE

## 2021-02-04 RX ORDER — FERROUS SULFATE 325(65) MG
325 TABLET ORAL DAILY
Refills: 0 | Status: DISCONTINUED | OUTPATIENT
Start: 2021-02-04 | End: 2021-02-05

## 2021-02-04 RX ORDER — SODIUM CHLORIDE 9 MG/ML
1000 INJECTION, SOLUTION INTRAVENOUS
Refills: 0 | Status: DISCONTINUED | OUTPATIENT
Start: 2021-02-04 | End: 2021-02-05

## 2021-02-04 RX ORDER — ATORVASTATIN CALCIUM 80 MG/1
40 TABLET, FILM COATED ORAL AT BEDTIME
Refills: 0 | Status: DISCONTINUED | OUTPATIENT
Start: 2021-02-04 | End: 2021-02-05

## 2021-02-04 RX ORDER — HYDRALAZINE HCL 50 MG
1 TABLET ORAL
Qty: 0 | Refills: 0 | DISCHARGE

## 2021-02-04 RX ORDER — ONDANSETRON 8 MG/1
4 TABLET, FILM COATED ORAL ONCE
Refills: 0 | Status: DISCONTINUED | OUTPATIENT
Start: 2021-02-04 | End: 2021-02-04

## 2021-02-04 RX ORDER — ALLOPURINOL 300 MG
100 TABLET ORAL DAILY
Refills: 0 | Status: DISCONTINUED | OUTPATIENT
Start: 2021-02-04 | End: 2021-02-05

## 2021-02-04 RX ADMIN — Medication 50 MILLIGRAM(S): at 16:57

## 2021-02-04 RX ADMIN — Medication 50 MILLIGRAM(S): at 23:27

## 2021-02-04 RX ADMIN — AMLODIPINE BESYLATE 10 MILLIGRAM(S): 2.5 TABLET ORAL at 16:59

## 2021-02-04 RX ADMIN — INSULIN GLARGINE 15 UNIT(S): 100 INJECTION, SOLUTION SUBCUTANEOUS at 23:28

## 2021-02-04 RX ADMIN — ATORVASTATIN CALCIUM 40 MILLIGRAM(S): 80 TABLET, FILM COATED ORAL at 23:27

## 2021-02-04 NOTE — H&P ADULT - NSICDXFAMILYHX_GEN_ALL_CORE_FT
FAMILY HISTORY:  Father  Still living? Unknown  Family history of diabetes mellitus (DM), Age at diagnosis: Age Unknown  Family history of hyperlipidemia, Age at diagnosis: Age Unknown  Family history of hypertension, Age at diagnosis: Age Unknown    Mother  Still living? Unknown  Family history of diabetes mellitus (DM), Age at diagnosis: Age Unknown  Family history of hyperlipidemia, Age at diagnosis: Age Unknown  Family history of hypertension, Age at diagnosis: Age Unknown    Sibling  Still living? Yes, Estimated age: Age Unknown  Family history of hyperlipidemia, Age at diagnosis: Age Unknown  Family history of hypertension, Age at diagnosis: Age Unknown    Grandparent  Still living? No  Family history of breast cancer, Age at diagnosis: Age Unknown    Aunt  Still living? No  Family history of breast cancer, Age at diagnosis: Age Unknown  Family history of diabetes mellitus (DM), Age at diagnosis: Age Unknown    Uncle  Still living? Yes, Estimated age: Age Unknown  Family history of diabetes mellitus (DM), Age at diagnosis: Age Unknown

## 2021-02-04 NOTE — CONSULT NOTE ADULT - ATTENDING COMMENTS
Rom Mohr MD  [facility]  Office [phone]  Ans Serv [phone] cell - [phone]

## 2021-02-04 NOTE — ED ADULT TRIAGE NOTE - CHIEF COMPLAINT QUOTE
pt went to HD (t/th/s) today and denied treatment for fever 99.8, pt c/o nausea, vomiting and diarrhea. last covid negative dec 21. pt had covid in may 2020. PMH: HTn, DM,left eye blindness

## 2021-02-04 NOTE — H&P ADULT - PROBLEM SELECTOR PLAN 1
- Likely 2/2 poor bp control vs. gastroenteritis.  - Abx exam benign and continue to tolerate diet.  - Continue to monitor sx. No signs of infection at present.  - Check UA.

## 2021-02-04 NOTE — H&P ADULT - NSHPPHYSICALEXAM_GEN_ALL_CORE
T(C): 36.7 (02-04-21 @ 16:57), Max: 37 (02-04-21 @ 10:38)  HR: 95 (02-04-21 @ 16:57) (95 - 102)  BP: 198/85 (02-04-21 @ 16:57) (193/114 - 198/85)  RR: 16 (02-04-21 @ 16:57) (12 - 16)  SpO2: 100% (02-04-21 @ 16:57) (98% - 100%)  Wt(kg): --  GENERAL: NAD, well-developed  HEAD:  Atraumatic, Normocephalic  EYES: EOMI, PERRLA, conjunctiva and sclera clear  NECK: Supple, No JVD  CHEST/LUNG: Clear to auscultation bilaterally; No wheeze  HEART: Regular rate and rhythm; No murmurs, rubs, or gallops  ABDOMEN: Soft, Nontender, Nondistended; Bowel sounds present  EXTREMITIES:  2+ Peripheral Pulses, No clubbing, cyanosis, or edema  PSYCH: AAOx3  NEUROLOGY: non-focal  SKIN: No rashes or lesions

## 2021-02-04 NOTE — H&P ADULT - NSHPLABSRESULTS_GEN_ALL_CORE
(02-04 @ 13:37)                      9.5  8.49 )-----------( 179                 28.1    Neutrophils = 5.69 (67.1%)  Lymphocytes = 1.65 (19.4%)  Eosinophils = 0.30 (3.5%)  Basophils = 0.05 (0.6%)  Monocytes = 0.78 (9.2%)  Bands = --%    02-04    134<L>  |  94<L>  |  89<H>  ----------------------------<  103<H>  6.1<H>   |  19<L>  |  17.75<H>    Ca    9.1      04 Feb 2021 13:37  Phos  7.2     02-04  Mg     2.7     02-04    TPro  7.7  /  Alb  4.4  /  TBili  0.3  /  DBili  x   /  AST  41<H>  /  ALT  11  /  AlkPhos  34<L>  02-04    Venous Blood Gas:  02-04 @ 13:37  7.33/45/47/22/76.7  VBG Lactate: 0.9

## 2021-02-04 NOTE — CONSULT NOTE ADULT - PROBLEM SELECTOR RECOMMENDATION 9
Maintenance HD schedule TTS, last dialyzed on 2/2.  Hyperkalemia noted, but hemolyzed sample.   K on VBG within normal limits.   Regardless, will plan for HD today, with UF goal 3kg, as BP tolerates.  HD consent obtained from pt.   Consider GI consult for chronic N/V.

## 2021-02-04 NOTE — ED ADULT NURSE NOTE - ED STAT RN HANDOFF DETAILS
Report given to ASHLEY Pascual. Pt a&ox4 and ambulatory. Pt respirations even and unlabored. pt abdomen soft nontender nondistended. Pt aware of plan of care. Vital signs as noted, call bell in reach, comfort measures provided, will continue to monitor till transport.

## 2021-02-04 NOTE — ED PROVIDER NOTE - CARE PLAN
Principal Discharge DX:	Hyperkalemia   Principal Discharge DX:	Hyperkalemia  Secondary Diagnosis:	Nausea vomiting and diarrhea

## 2021-02-04 NOTE — ED PROVIDER NOTE - PHYSICAL EXAMINATION
CONSTITUTIONAL: Well-developed; well-nourished; in no acute distress.   SKIN: warm, dry  HEAD: Normocephalic; atraumatic.  EYES: no conjunctival injection.   ENT: No nasal discharge; airway clear.  NECK: Supple; non tender.  CARD: S1, S2 normal; no murmurs, gallops, or rubs. Regular rate and rhythm.   RESP: No wheezes, rales or rhonchi. Good air movement bilaterally.   ABD: soft ntnd, no guarding, no distention, no rigidity.   EXT:  No cyanosis or edema.   NEURO: Alert, oriented, grossly unremarkable  PSYCH: Cooperative, appropriate.

## 2021-02-04 NOTE — ED PROVIDER NOTE - OBJECTIVE STATEMENT
41 y/o M w/ pmhx of DM, HTN, ESRD on HD Tues, Thurs, Sat presents c/o nausea, vomiting and diarrhea. This problem has been going on for the past couple of months according to the patient. States he went to HD today and they sent him to the ED due to the chronic complaints. States his last bowel movement was yesterday. States he is currently nauseous in the ED. Denies any recent fevers, chills, change of smell or taste, chest pain, abd pain, diarrhea or dysuria.

## 2021-02-04 NOTE — H&P ADULT - PROBLEM SELECTOR PLAN 3
- Per discussion with ED resident, nephrology service aware patient is admitted. Will f/u further recommendation.  - HD schedule TTS. Likely need HD later today or early tomorrow.

## 2021-02-04 NOTE — ED ADULT NURSE REASSESSMENT NOTE - NS ED NURSE REASSESS COMMENT FT1
Report given to Dialysis. pt a&ox4 and ambualtory. Pt to go to dialysis then room 543a. Pt aware of plan of care. pt awaiting transport will continue to monitor.

## 2021-02-04 NOTE — H&P ADULT - NSHPREVIEWOFSYSTEMS_GEN_ALL_CORE
CONSTITUTIONAL: No weakness, fevers or chills  EYES/ENT: No visual changes;  No vertigo or throat pain   NECK: No pain or stiffness  RESPIRATORY: No cough, wheezing, hemoptysis; No shortness of breath  CARDIOVASCULAR: No chest pain or palpitations  GASTROINTESTINAL: n/v  GENITOURINARY: No dysuria, frequency or hematuria  NEUROLOGICAL: No numbness or weakness  SKIN: No itching, burning, rashes, or lesions   PSYCH: No Depression, no anxiety  All other review of systems is negative unless indicated above.

## 2021-02-04 NOTE — H&P ADULT - NSICDXPASTMEDICALHX_GEN_ALL_CORE_FT
PAST MEDICAL HISTORY:  Anemia of chronic disease     Diabetic macular edema     DM (diabetes mellitus)     HLD (hyperlipidemia)     HTN (hypertension)     Hyperuricemia     Obesity     Seasonal allergies     Stage 5 chronic kidney disease not on chronic dialysis     Subclinical hypothyroidism     TIA (transient ischemic attack)

## 2021-02-04 NOTE — ED PROVIDER NOTE - CLINICAL SUMMARY MEDICAL DECISION MAKING FREE TEXT BOX
O'Armando DO PGY-1: pt presents from HD center but did not receive HD. c/o nausea, vomiting and diarrhea. Concern for electrolyte abnormalities .

## 2021-02-04 NOTE — CONSULT NOTE ADULT - SUBJECTIVE AND OBJECTIVE BOX
[facility] Consult Note Nephrology - CONSULTATION NOTE - [phone] - Dr Mohr / Dr Delgado / Dr Martin / Dr Alcala / Dr Ruiz / Dr De La Torre / Dr Cross / Dr Ruzi    Patient is a 40y Male with ESRD on HD TTS, HTN, DM a/w N/V. Ongoing complaints of nausea and vomiting for several weeks to months. Seen by GI in past and had EGD, and told it was gastroparesis. Yesterday pt c/o vomiting. When he went to HD unit today for his usual schedule HD session, he was recommended to come to ED instead for w/u. He denies fever or chills or LE swelling, or cough. Denies diarrhea or abd pain. He was last dialyzed on 2/2.      PAST MEDICAL & SURGICAL HISTORY:  Diabetic macular edema    Subclinical hypothyroidism    Obesity    Anemia of chronic disease    TIA (transient ischemic attack)    Hyperuricemia    Seasonal allergies    HLD (hyperlipidemia)    DM (diabetes mellitus)    Stage 5 chronic kidney disease not on chronic dialysis    HTN (hypertension)    Renal stone  insertion of left ureteral stone    Detached retina, left  repair of retina    History of left cataract extraction      Allergies:  No Known Allergies    Home Medications Reviewed  Hospital Medications:   MEDICATIONS  (STANDING):  allopurinol 100 milliGRAM(s) Oral daily  amLODIPine   Tablet 10 milliGRAM(s) Oral at bedtime  atorvastatin 40 milliGRAM(s) Oral at bedtime  calcium gluconate IVPB 1 Gram(s) IV Intermittent once  dextrose 40% Gel 15 Gram(s) Oral once  dextrose 5%. 1000 milliLiter(s) (50 mL/Hr) IV Continuous <Continuous>  dextrose 5%. 1000 milliLiter(s) (100 mL/Hr) IV Continuous <Continuous>  dextrose 50% Injectable 25 Gram(s) IV Push once  dextrose 50% Injectable 12.5 Gram(s) IV Push once  dextrose 50% Injectable 25 Gram(s) IV Push once  ferrous    sulfate 325 milliGRAM(s) Oral daily  glucagon  Injectable 1 milliGRAM(s) IntraMuscular once  hydrALAZINE 50 milliGRAM(s) Oral two times a day  insulin glargine Injectable (LANTUS) 15 Unit(s) SubCutaneous at bedtime  insulin lispro (ADMELOG) corrective regimen sliding scale   SubCutaneous three times a day before meals  insulin lispro (ADMELOG) corrective regimen sliding scale   SubCutaneous at bedtime  sodium polystyrene sulfonate Suspension 15 Gram(s) Oral once    SOCIAL HISTORY:  Denies ETOh,Smoking,   FAMILY HISTORY:  Family history of breast cancer (Aunt)    Family history of breast cancer (Grandparent)    Family history of hyperlipidemia (Father, Mother, Sibling)    Family history of hypertension (Father, Mother, Sibling)    Family history of diabetes mellitus (DM) (Father, Mother, Aunt, Uncle)      REVIEW OF SYSTEMS:  CONSTITUTIONAL: No weakness, fevers or chills  EYES/ENT: No visual changes;  No vertigo or throat pain   NECK: No pain or stiffness  RESPIRATORY: No cough, wheezing, hemoptysis; No shortness of breath  CARDIOVASCULAR: No chest pain or palpitations.  GASTROINTESTINAL: No abdominal or epigastric pain. +nausea, and vomiting. No diarrhea or constipation. No melena or hematochezia.  GENITOURINARY: No dysuria, frequency, foamy urine, urinary urgency, incontinence or hematuria  NEUROLOGICAL: No numbness or weakness  SKIN: No itching, burning, rashes, or lesions   VASCULAR: No bilateral lower extremity edema.   All other review of systems is negative unless indicated above.    VITALS:  T(F): 98.1 (02-04-21 @ 16:57), Max: 98.6 (02-04-21 @ 10:38)  HR: 95 (02-04-21 @ 16:57)  BP: 198/85 (02-04-21 @ 16:57)  RR: 16 (02-04-21 @ 16:57)  SpO2: 100% (02-04-21 @ 16:57)  Wt(kg): --    Height (cm): 167.6 (02-04 @ 10:38)  PHYSICAL EXAM:  Constitutional: NAD  HEENT: anicteric sclera, oropharynx clear, MMM  Neck: No JVD  Respiratory: CTAB, no wheezes, rales or rhonchi  Cardiovascular: S1, S2, RRR  Gastrointestinal: BS+, soft, NT/ND  Extremities: No cyanosis or clubbing. No peripheral edema  Neurological: A/O x 3, no focal deficits  Psychiatric: Normal mood, normal affect  : No CVA tenderness. No turcios.   Skin: No rashes  Vascular Access: UE AV access +thrill and bruit.     LABS:  02-04    134<L>  |  94<L>  |  89<H>  ----------------------------<  103<H>  6.1<H>   |  19<L>  |  17.75<H>    Ca    9.1      04 Feb 2021 13:37  Phos  7.2     02-04  Mg     2.7     02-04    TPro  7.7  /  Alb  4.4  /  TBili  0.3  /  DBili      /  AST  41<H>  /  ALT  11  /  AlkPhos  34<L>  02-04    Creatinine Trend: 17.75 <--                        9.5    8.49  )-----------( 179      ( 04 Feb 2021 13:37 )             28.1     Urine Studies:      RADIOLOGY & ADDITIONAL STUDIES:  < from: Xray Chest 2 Views PA/Lat (02.04.21 @ 12:40) >  IMPRESSION: Clear lungs.    < end of copied text >

## 2021-02-04 NOTE — H&P ADULT - HISTORY OF PRESENT ILLNESS
39 y/o M w/ pmhx of DM, HTN, ESRD on HD Tues, Thurs, Sat presents c/o nausea, vomiting. This problem has been going on for the past couple of months according to the patient. States he went to HD today and they sent him to the ED due to the chronic complaints. Patient is currently nauseous in the ED and he did not take home meds today. Denies any recent fevers, chills, change of smell or taste, chest pain, abd pain, diarrhea or dysuria.

## 2021-02-04 NOTE — ED PROVIDER NOTE - NS ED ROS FT
CONSTITUTIONAL - No fever, No diaphoresis, No weight change  SKIN - No rash  HEMATOLOGIC - No abnormal bleeding or bruising  EYES - No eye pain, No blurred vision  ENT - No change in hearing, No sore throat, No neck pain, No rhinorrhea, No ear pain  RESPIRATORY - No shortness of breath, No cough  CARDIAC -No chest pain, No palpitations  GI - No abdominal pain, +nausea, +vomiting, No diarrhea, No constipation  - No dysuria, no frequency, no hematuria.   MUSCULOSKELETAL - No joint pain, No swelling, No back pain  NEUROLOGIC - No numbness, No focal weakness, No headache, No dizziness

## 2021-02-04 NOTE — ED ADULT NURSE NOTE - OBJECTIVE STATEMENT
Pt. is A&Ox3 presents to ER c/o nausea, vomiting, and diarrhea for a few weeks. Pt. started experiencing SOB this AM, has resolved since. Pt. went to hemodialysis today usually has sessions (T/TH/S), was denies treatment because oral temp was 99.8. Pt. has a PMH of HTN, DM, HTN, and left eye blindness. Noted RFA AV Fistula. Pt. respirations even and unlabored, abdomen nondistended and nontender, skin intact. Safety precautions obtained. Will medicate as ordered.

## 2021-02-04 NOTE — H&P ADULT - BIRTH SEX
[facility] DIABETES PROGRAM  =============================================  Barbie Wayne is a 61 y.o. male enrolled in the [facility] [employer] [facility] Diabetes [facility].      Identified care gap(s): A1c > 8%, medication follow up (Ozempic)    DM Program Prescriptions:  Medication Sig    hydroCHLOROthiazide (HYDRODIURIL) 25 mg tablet Take 1 Tab by mouth daily.  metFORMIN (GLUCOPHAGE) 500 mg tablet Take 1 Tab by mouth two (2) times daily (with meals).  aspirin 81 mg chewable tablet Take 1 Tab by mouth daily.  Blood-Glucose Meter (DueProps Wireless 2 Meter kit) monitoring kit Use as directed to monitor blood sugars    glucose blood VI test strips [employer] Jazz) strip Use to test blood sugar 2 times daily    lancets misc Use to test blood sugar 2 times daily    Blood Glucose Control, High soln Use to complete control solution test    semaglutide (Ozempic) 1 mg/dose (2 mg/1.5 mL) sub-q pen 1 mg by SubCUTAneous route every seven (7) days.  Insulin Needles, Disposable, (PEN NEEDLE) 32 gauge x 5/32\" ndle Use with victoza        Allergies:  No Known Allergies     Labs:  Component      Latest Ref Rng & Units 8/3/2020 4/28/2020 1/22/2020          10:12 AM  4:28 AM 10:10 AM   Hemoglobin A1c, (calculated)      4.2 - 5.6 % 9.0 (H) 10.6 (H) 9.5 (H)     Estimated Creatinine Clearance: 156.9 mL/min (by C-G formula based on SCr of 0.81 mg/dL). Component      Latest Ref Rng & Units 4/29/2020 4/28/2020 4/27/2020           3:00 AM  4:29 AM  9:01 PM   GFR est non-AA      >60 ml/min/1.73m2 >60 58 (L) 51 (L)       Care Team:   - Physician (PCP/Endocrinologist): Kaleb Nolasco MD (Last OV: 8/12/20; Next OV: to be scheduled)    Diabetes Care:  - Glycemic Goal: <7.0%. Is not at blood glucose goal but noted improvement with most recent result.  Current regimen metformin 500 mg BID, Ozempic 1 mg once weekly; Victoza 1.8 mg daily changed to Ozempic 1 mg daily @5/20/20 to assist with adherence - per 7/8/20 pharmacy encounter had not yet changed would would expect he has started now. Previously not adhering to diabetic diet. ? Reduce Pill Wing: n/a  ? Therapy Optimization: can continue to titrate metformin as needed/tolerated; future consideration to initiate SGLT2i with cardiovascular benefit (PVD/CAD per problem list but pt denied history during 5/19/20 [facility] visit?) - Sandra Meeks is covered option. Would like to maximize adherence with GLP-1RA  ? Medication changes since last A1c: Victoza changed to Ozempic @5/20/20 to assist with adherence  ? Medication adherence assessment:  § Metformin: 1/22/20, 5/1/20 x 90 ds (180 tabs) - overdue for refill? § Victoza: 1/22/20, 2/13/20, 5/1/20 x 30 ds (noted some denied claims due to step therapy) - gaps noted, per 5/19/20 [facility] visit pt reported troubles remembering to take daily injection  § Ozempic: 5/21/20 x 28 ds - may be overdue for refill? Per 7/8/20 encounter had @1.5 weeks left of Victoza then planned to switch to Ozempic  § ASA: Last fill 5/21/20 x 90 ds  § HCTZ: LF 6/2/20 x 90 ds  § [employer] Jazz supplies: filled 5/21/20; per 7/8/20 encounter had not yet started    Attempting to reach patient. No answer, LM. Would like to review:   Follow up on current DM regimen & SMBGs   Ozempic tolerability if he has started?  Assist with any refills - metformin, Ozempic, aspirin?      Nadia French, PharmD, [facility] Pharmacist  Dept: [phone] (toll free [phone], option 7) Male

## 2021-02-04 NOTE — H&P ADULT - PROBLEM SELECTOR PLAN 2
- BMP specimen severely hemolyzed on admission. Will recheck. K from VBG slightly elevated at 4.7 no EKG changes.  - Will give calcium gluconate 1gram and lokema.  - Continue to trend BMP.

## 2021-02-05 ENCOUNTER — TRANSCRIPTION ENCOUNTER (OUTPATIENT)
Age: 41
End: 2021-02-05

## 2021-02-05 VITALS
DIASTOLIC BLOOD PRESSURE: 70 MMHG | OXYGEN SATURATION: 100 % | HEART RATE: 92 BPM | RESPIRATION RATE: 18 BRPM | SYSTOLIC BLOOD PRESSURE: 163 MMHG | TEMPERATURE: 99 F

## 2021-02-05 LAB
A1C WITH ESTIMATED AVERAGE GLUCOSE RESULT: 5.3 % — SIGNIFICANT CHANGE UP (ref 4–5.6)
ALBUMIN SERPL ELPH-MCNC: 4.3 G/DL — SIGNIFICANT CHANGE UP (ref 3.3–5)
ALP SERPL-CCNC: 39 U/L — LOW (ref 40–120)
ALT FLD-CCNC: 8 U/L — SIGNIFICANT CHANGE UP (ref 4–41)
ANION GAP SERPL CALC-SCNC: 18 MMOL/L — HIGH (ref 7–14)
APPEARANCE UR: CLEAR — SIGNIFICANT CHANGE UP
AST SERPL-CCNC: 11 U/L — SIGNIFICANT CHANGE UP (ref 4–40)
BACTERIA # UR AUTO: NEGATIVE — SIGNIFICANT CHANGE UP
BILIRUB SERPL-MCNC: 0.4 MG/DL — SIGNIFICANT CHANGE UP (ref 0.2–1.2)
BILIRUB UR-MCNC: NEGATIVE — SIGNIFICANT CHANGE UP
BUN SERPL-MCNC: 52 MG/DL — HIGH (ref 7–23)
CALCIUM SERPL-MCNC: 9.4 MG/DL — SIGNIFICANT CHANGE UP (ref 8.4–10.5)
CHLORIDE SERPL-SCNC: 93 MMOL/L — LOW (ref 98–107)
CO2 SERPL-SCNC: 27 MMOL/L — SIGNIFICANT CHANGE UP (ref 22–31)
COLOR SPEC: SIGNIFICANT CHANGE UP
CREAT SERPL-MCNC: 12.62 MG/DL — HIGH (ref 0.5–1.3)
DIFF PNL FLD: ABNORMAL
EPI CELLS # UR: 2 /HPF — SIGNIFICANT CHANGE UP (ref 0–5)
ESTIMATED AVERAGE GLUCOSE: 105 MG/DL — SIGNIFICANT CHANGE UP (ref 68–114)
GLUCOSE BLDC GLUCOMTR-MCNC: 111 MG/DL — HIGH (ref 70–99)
GLUCOSE BLDC GLUCOMTR-MCNC: 121 MG/DL — HIGH (ref 70–99)
GLUCOSE SERPL-MCNC: 110 MG/DL — HIGH (ref 70–99)
GLUCOSE UR QL: ABNORMAL
HBV CORE AB SER-ACNC: SIGNIFICANT CHANGE UP
HBV SURFACE AB SER-ACNC: >1000 MIU/ML — SIGNIFICANT CHANGE UP
HCT VFR BLD CALC: 28.7 % — LOW (ref 39–50)
HGB BLD-MCNC: 9.6 G/DL — LOW (ref 13–17)
HYALINE CASTS # UR AUTO: 2 /LPF — SIGNIFICANT CHANGE UP (ref 0–7)
KETONES UR-MCNC: NEGATIVE — SIGNIFICANT CHANGE UP
LEUKOCYTE ESTERASE UR-ACNC: NEGATIVE — SIGNIFICANT CHANGE UP
MAGNESIUM SERPL-MCNC: 2.4 MG/DL — SIGNIFICANT CHANGE UP (ref 1.6–2.6)
MCHC RBC-ENTMCNC: 30.2 PG — SIGNIFICANT CHANGE UP (ref 27–34)
MCHC RBC-ENTMCNC: 33.4 GM/DL — SIGNIFICANT CHANGE UP (ref 32–36)
MCV RBC AUTO: 90.3 FL — SIGNIFICANT CHANGE UP (ref 80–100)
MRSA PCR RESULT.: SIGNIFICANT CHANGE UP
NITRITE UR-MCNC: NEGATIVE — SIGNIFICANT CHANGE UP
NRBC # BLD: 0 /100 WBCS — SIGNIFICANT CHANGE UP
NRBC # FLD: 0 K/UL — SIGNIFICANT CHANGE UP
PH UR: 7.5 — SIGNIFICANT CHANGE UP (ref 5–8)
PLATELET # BLD AUTO: 193 K/UL — SIGNIFICANT CHANGE UP (ref 150–400)
POTASSIUM SERPL-MCNC: 3.7 MMOL/L — SIGNIFICANT CHANGE UP (ref 3.5–5.3)
POTASSIUM SERPL-SCNC: 3.7 MMOL/L — SIGNIFICANT CHANGE UP (ref 3.5–5.3)
PROT SERPL-MCNC: 7.4 G/DL — SIGNIFICANT CHANGE UP (ref 6–8.3)
PROT UR-MCNC: ABNORMAL
RBC # BLD: 3.18 M/UL — LOW (ref 4.2–5.8)
RBC # FLD: 13.1 % — SIGNIFICANT CHANGE UP (ref 10.3–14.5)
RBC CASTS # UR COMP ASSIST: 2 /HPF — SIGNIFICANT CHANGE UP (ref 0–4)
S AUREUS DNA NOSE QL NAA+PROBE: SIGNIFICANT CHANGE UP
SODIUM SERPL-SCNC: 138 MMOL/L — SIGNIFICANT CHANGE UP (ref 135–145)
SP GR SPEC: 1.01 — SIGNIFICANT CHANGE UP (ref 1.01–1.02)
UROBILINOGEN FLD QL: SIGNIFICANT CHANGE UP
WBC # BLD: 7.97 K/UL — SIGNIFICANT CHANGE UP (ref 3.8–10.5)
WBC # FLD AUTO: 7.97 K/UL — SIGNIFICANT CHANGE UP (ref 3.8–10.5)
WBC UR QL: 16 /HPF — HIGH (ref 0–5)

## 2021-02-05 RX ORDER — CHLORHEXIDINE GLUCONATE 213 G/1000ML
1 SOLUTION TOPICAL DAILY
Refills: 0 | Status: DISCONTINUED | OUTPATIENT
Start: 2021-02-05 | End: 2021-02-05

## 2021-02-05 RX ADMIN — Medication 50 MILLIGRAM(S): at 04:54

## 2021-02-05 RX ADMIN — Medication 100 MILLIGRAM(S): at 12:31

## 2021-02-05 RX ADMIN — Medication 325 MILLIGRAM(S): at 12:31

## 2021-02-05 RX ADMIN — CHLORHEXIDINE GLUCONATE 1 APPLICATION(S): 213 SOLUTION TOPICAL at 12:31

## 2021-02-05 NOTE — DISCHARGE NOTE NURSING/CASE MANAGEMENT/SOCIAL WORK - NSSCCONTNUM_GEN_ALL_CORE
[facility] Coordinator [phone] for CDPAP Service reinstatement-granted

## 2021-02-05 NOTE — DISCHARGE NOTE NURSING/CASE MANAGEMENT/SOCIAL WORK - NSDCFUADDAPPT_GEN_ALL_CORE_FT
Transportation arranged with [employer] for 5pm pickup via [facility]. Patient and RN are aware.

## 2021-02-05 NOTE — DISCHARGE NOTE PROVIDER - NSDCCPCAREPLAN_GEN_ALL_CORE_FT
PRINCIPAL DISCHARGE DIAGNOSIS  Diagnosis: Nausea vomiting and diarrhea  Assessment and Plan of Treatment: - your symptoms likely from poor blood pressure control vs gastroenteritis  - your abdomenal exam was benign and you tolerate diet well  - No signs of infection at present  - follow up with your PCP as outpatient for further management      SECONDARY DISCHARGE DIAGNOSES  Diagnosis: ESRD (end stage renal disease)  Assessment and Plan of Treatment: - Nephrology consulted- your potassium level was elevated on admission and you were supplemented, also had HD done  - resolved, Potassium normal now  - continue with HD schedule on Tuesday, Thursday, S. Likely need HD later today or early tomorrow    Diagnosis: Essential hypertension  Assessment and Plan of Treatment: - Continue current blood pressure medication regimen as directed. Monitor for any visual changes, headaches or dizziness.  Monitor blood pressure regularly.  Follow up with your PCP for further management for high blood pressure, please call to make appointment within 1 week of discharge    Diagnosis: Type 2 diabetes mellitus with hyperglycemia, with long-term current use of insulin  Assessment and Plan of Treatment: - Monitor finger sticks pre-meal and bedtime, low salt, fat and carbohydrate diet, minimize glucose intake. HgA1C5.3%  - Exercise daily for at least 30 minutes and weight loss.  Follow up with primary care physician and endocrinologist for routine Hemoglobin A1C checks and management   - Follow up with your ophthalmologist for routine yearly vision exams

## 2021-02-05 NOTE — PROGRESS NOTE ADULT - SUBJECTIVE AND OBJECTIVE BOX
[facility] - S Danny / Onur S /D Cari/ S Joseph/ SID Martin/ Ludwig Cross / JAZMYNE Rou/ O Britt  service -[phone], office [phone]  ---------------------------------------------------------------------------------------------------------------    Patient seen and examined bedside    Subjective and Objective: No overnight events, sob resolved. No complaints today. feeling better    Allergies: No Known Allergies      Hospital Medications:   MEDICATIONS  (STANDING):  allopurinol 100 milliGRAM(s) Oral daily  amLODIPine   Tablet 10 milliGRAM(s) Oral at bedtime  atorvastatin 40 milliGRAM(s) Oral at bedtime  chlorhexidine 4% Liquid 1 Application(s) Topical daily  dextrose 40% Gel 15 Gram(s) Oral once  dextrose 5%. 1000 milliLiter(s) (50 mL/Hr) IV Continuous <Continuous>  dextrose 5%. 1000 milliLiter(s) (100 mL/Hr) IV Continuous <Continuous>  dextrose 50% Injectable 25 Gram(s) IV Push once  dextrose 50% Injectable 12.5 Gram(s) IV Push once  dextrose 50% Injectable 25 Gram(s) IV Push once  ferrous    sulfate 325 milliGRAM(s) Oral daily  glucagon  Injectable 1 milliGRAM(s) IntraMuscular once  hydrALAZINE 50 milliGRAM(s) Oral two times a day  insulin glargine Injectable (LANTUS) 15 Unit(s) SubCutaneous at bedtime  insulin lispro (ADMELOG) corrective regimen sliding scale   SubCutaneous three times a day before meals  insulin lispro (ADMELOG) corrective regimen sliding scale   SubCutaneous at bedtime    VITALS:  T(F): 98.7 (21 @ 14:49), Max: 98.7 (21 @ 14:49)  HR: 92 (21 @ 14:49)  BP: 163/70 (21 @ 14:49)  RR: 18 (21 @ 14:49)  SpO2: 100% (21 @ 14:49)  Wt(kg): --    Height (cm): 167.6 ( @ 21:36)  Weight (kg): 68.1 ( @ 21:36)  BMI (kg/m2): 24.2 ( @ 21:36)  BSA (m2): 1.77 ( @ 21:36)    PHYSICAL EXAM:  Constitutional: NAD  HEENT: anicteric sclera  Neck: No JVD  Respiratory: CTAB, no wheezes, rales or rhonchi  Cardiovascular: S1, S2, RRR  Gastrointestinal: BS+, soft, NT/ND  Extremities: No peripheral edema  Neurological: A/O x 3  Psychiatric: Normal mood, normal affect  : No CVA tenderness. No turcios.   Skin: No rashes  Vascular Access: UE AV access +thrill and bruit.     LABS:      138  |  93<L>  |  52<H>  ----------------------------<  110<H>  3.7   |  27  |  12.62<H>    Ca    9.4      2021 06:55  Phos  7.2     -  Mg     2.4         TPro  7.4  /  Alb  4.3  /  TBili  0.4  /  DBili      /  AST  11  /  ALT  8   /  AlkPhos  39<L>  -    Creatinine Trend: 12.62 <--, 18.17 <--, 17.75 <--                        9.6    7.97  )-----------( 193      ( 2021 06:55 )             28.7     Urine Studies:  Urinalysis Basic - ( 2021 06:55 )    Color: Light Yellow / Appearance: Clear / S.012 / pH:   Gluc:  / Ketone: Negative  / Bili: Negative / Urobili: <2 mg/dL   Blood:  / Protein: 300 mg/dL / Nitrite: Negative   Leuk Esterase: Negative / RBC: 2 /HPF / WBC 16 /HPF   Sq Epi:  / Non Sq Epi: 2 /HPF / Bacteria: Negative      RADIOLOGY & ADDITIONAL STUDIES:   [facility] - S Danny / Onur S /D Cari/ S Joseph/ SID Martin/ Ludwig Cross / JAZMYNE Rou/ O Britt  service -[phone], office [phone]  ---------------------------------------------------------------------------------------------------------------    Patient seen and examined bedside    Subjective and Objective: No overnight events, denied sob. No complaints today. feeling better    Allergies: No Known Allergies      Hospital Medications:   MEDICATIONS  (STANDING):  allopurinol 100 milliGRAM(s) Oral daily  amLODIPine   Tablet 10 milliGRAM(s) Oral at bedtime  atorvastatin 40 milliGRAM(s) Oral at bedtime  chlorhexidine 4% Liquid 1 Application(s) Topical daily  dextrose 40% Gel 15 Gram(s) Oral once  dextrose 5%. 1000 milliLiter(s) (50 mL/Hr) IV Continuous <Continuous>  dextrose 5%. 1000 milliLiter(s) (100 mL/Hr) IV Continuous <Continuous>  dextrose 50% Injectable 25 Gram(s) IV Push once  dextrose 50% Injectable 12.5 Gram(s) IV Push once  dextrose 50% Injectable 25 Gram(s) IV Push once  ferrous    sulfate 325 milliGRAM(s) Oral daily  glucagon  Injectable 1 milliGRAM(s) IntraMuscular once  hydrALAZINE 50 milliGRAM(s) Oral two times a day  insulin glargine Injectable (LANTUS) 15 Unit(s) SubCutaneous at bedtime  insulin lispro (ADMELOG) corrective regimen sliding scale   SubCutaneous three times a day before meals  insulin lispro (ADMELOG) corrective regimen sliding scale   SubCutaneous at bedtime    VITALS:  T(F): 98.7 (21 @ 14:49), Max: 98.7 (21 @ 14:49)  HR: 92 (21 @ 14:49)  BP: 163/70 (21 @ 14:49)  RR: 18 (21 @ 14:49)  SpO2: 100% (21 @ 14:49)  Wt(kg): --    Height (cm): 167.6 ( @ 21:36)  Weight (kg): 68.1 ( @ 21:36)  BMI (kg/m2): 24.2 ( @ 21:36)  BSA (m2): 1.77 ( @ 21:36)    PHYSICAL EXAM:  Constitutional: NAD  HEENT: anicteric sclera  Neck: No JVD  Respiratory: CTAB, no wheezes, rales or rhonchi  Cardiovascular: S1, S2, RRR  Gastrointestinal: BS+, soft, NT/ND  Extremities: No peripheral edema  Neurological: A/O x 3  Psychiatric: Normal mood, normal affect  : No turcios.   Skin: No rashes  Vascular Access: UE AV access +thrill and bruit.     LABS:      138  |  93<L>  |  52<H>  ----------------------------<  110<H>  3.7   |  27  |  12.62<H>    Ca    9.4      2021 06:55  Phos  7.2     -  Mg     2.4         TPro  7.4  /  Alb  4.3  /  TBili  0.4  /  DBili      /  AST  11  /  ALT  8   /  AlkPhos  39<L>  02-    Creatinine Trend: 12.62 <--, 18.17 <--, 17.75 <--                        9.6    7.97  )-----------( 193      ( 2021 06:55 )             28.7     Urine Studies:  Urinalysis Basic - ( 2021 06:55 )    Color: Light Yellow / Appearance: Clear / S.012 / pH:   Gluc:  / Ketone: Negative  / Bili: Negative / Urobili: <2 mg/dL   Blood:  / Protein: 300 mg/dL / Nitrite: Negative   Leuk Esterase: Negative / RBC: 2 /HPF / WBC 16 /HPF   Sq Epi:  / Non Sq Epi: 2 /HPF / Bacteria: Negative      RADIOLOGY & ADDITIONAL STUDIES:

## 2021-02-05 NOTE — PROGRESS NOTE ADULT - ASSESSMENT
40y Male with ESRD on HD TTS, HTN, DM a/w N/V. Renal following for ESRD Mx.    ESRD on HD  maintenance HD schedule TTS  s/p HD yesterday, net UF 3kg, tolerated well. uneventful.  plan to rpt HD tomorrow  c/w renal restrictions in diet  dose all meds dose ESRD  Anemia in CKD- Hb <goal -add MARISA  HTN, uncontrolled      labs, chart reviewed     40y Male with ESRD on HD TTS, HTN, DM a/w N/V. Renal following for ESRD Mx.    ESRD on HD  maintenance HD schedule TTS  K, vol -ok  s/p HD yesterday, net UF 3kg, tolerated well. uneventful.  plan to rpt HD tomorrow  c/w renal restrictions in diet  dose all meds dose ESRD  Anemia in CKD- Hb <goal -will add MARISA w/next hd  HTN, uncontrolled- c/w current bp meds. plan to inc uf w/hd      labs, chart reviewed  d/c plan per medicine

## 2021-02-05 NOTE — DISCHARGE NOTE PROVIDER - HOSPITAL COURSE
39 y/o M w/ pmhx of DM, HTN, ESRD on HD Tues, Thurs, Sat presents c/o nausea, vomiting    Nausea and vomiting- Likely 2/2 poor bp control vs. gastroenteritis  - Abx exam benign and continue to tolerate diet  - Continue to monitor sx. No signs of infection at present, UA -positive    Hyperkalemia- K from VBG slightly elevated at 4.7 no EKG changes  - s/p Calcium gluconate 1gram and lokema  - Continue to trend BMP    ESRD - Nephrology consulted  - HD schedule TTS. Likely need HD later today or early tomorrow    Type 2 diabetes mellitus with hyperglycemia, with long-term current use of insulin  - FSG monitoring, SSI, HgA1C5.3%  - Lantus 15u qhs    Essential hypertension  - Hypertensive on admission; however, this is in the context of not taking home meds   - restarted home bp meds. Monitor BP    Need for prophylactic measure- DVT ppx w/ SCDs    Dispo -home with outpatient follow up

## 2021-02-05 NOTE — DISCHARGE NOTE NURSING/CASE MANAGEMENT/SOCIAL WORK - PATIENT PORTAL LINK FT
You can access the FollowMyHealth Patient Portal offered by [facility] by registering at the following website: [email or web address]. By joining [employer]’s FollowMyHealth portal, you will also be able to view your health information using other applications (apps) compatible with our system.

## 2021-02-05 NOTE — DISCHARGE NOTE PROVIDER - NSDCFUADDAPPT_GEN_ALL_CORE_FT
Transportation arranged with [employer] for 5pm pickup via [facility]. Patient and RN are aware.

## 2021-02-05 NOTE — DISCHARGE NOTE PROVIDER - CARE PROVIDER_API CALL
Rom Mohr)  Internal Medicine; Nephrology  [address] [place] [address]  Phone: [phone]  Fax: [phone]  Follow Up Time:

## 2021-02-05 NOTE — DISCHARGE NOTE PROVIDER - NSDCMRMEDTOKEN_GEN_ALL_CORE_FT
allopurinol 100 mg oral tablet: 1 tab(s) orally once a day  amLODIPine 10 mg oral tablet: 1 tab(s) orally once a day  atorvastatin 40 mg oral tablet: 1 tab(s) orally once a day  ferrous sulfate 325 mg (65 mg elemental iron) oral tablet: 1 tab(s) orally once a day  hydrALAZINE 50 mg oral tablet: 1 tab(s) orally 2 times a day  Lantus 100 units/mL subcutaneous solution: 20 unit(s) subcutaneous once a day (at bedtime)

## 2021-02-05 NOTE — PROGRESS NOTE ADULT - ATTENDING COMMENTS
[facility]  Office [phone]  Ans Serv [phone]  [facility] - [phone]

## 2021-03-05 ENCOUNTER — TRANSCRIPTION ENCOUNTER (OUTPATIENT)
Age: 41
End: 2021-03-05

## 2021-03-11 ENCOUNTER — NON-APPOINTMENT (OUTPATIENT)
Age: 41
End: 2021-03-11

## 2021-04-26 ENCOUNTER — APPOINTMENT (OUTPATIENT)
Dept: VASCULAR SURGERY | Facility: CLINIC | Age: 41
End: 2021-04-26

## 2021-06-16 NOTE — ED ADULT NURSE NOTE - PAIN RATING/NUMBER SCALE (0-10): REST
Olimpia Fernandez CMA       0

## 2021-07-12 ENCOUNTER — APPOINTMENT (OUTPATIENT)
Dept: VASCULAR SURGERY | Facility: CLINIC | Age: 41
End: 2021-07-12

## 2021-08-03 NOTE — ASU DISCHARGE PLAN (ADULT/PEDIATRIC). - NOTIFY
Swelling that continues/Pain not relieved by Medications/Bleeding that does not stop Swelling that continues/Bleeding that does not stop/Fever greater than 101/Pain not relieved by Medications no

## 2021-08-20 NOTE — H&P PST ADULT - ALLERGY TYPES
All follow up care discussed. No other questions voiced. Stable and using crutches to the lobby with mom. PIV removed.       Valentín Atwood RN  08/20/21 [phone]
Pt DC with right knee immobilizer and crutches per order.       Lola Harper RN  08/20/21 [phone]
outdoor environmental allergies

## 2021-08-23 ENCOUNTER — APPOINTMENT (OUTPATIENT)
Dept: VASCULAR SURGERY | Facility: CLINIC | Age: 41
End: 2021-08-23
Payer: MEDICARE

## 2021-08-23 VITALS
WEIGHT: 172.4 LBS | SYSTOLIC BLOOD PRESSURE: 165 MMHG | DIASTOLIC BLOOD PRESSURE: 89 MMHG | HEIGHT: 65.5 IN | BODY MASS INDEX: 28.38 KG/M2 | HEART RATE: 92 BPM

## 2021-08-23 PROCEDURE — 99213 OFFICE O/P EST LOW 20 MIN: CPT

## 2021-08-23 PROCEDURE — 93990 DOPPLER FLOW TESTING: CPT

## 2021-08-23 NOTE — DISCUSSION/SUMMARY
[FreeTextEntry1] : 41 yo male with history of esrd on hd presents for follow up \par duplex shows patent avf with flow rate of 658\par given no difficulty with hd will continue to monitor pt to follow up in 3 months with repeat duplex\par will also complete steal study at that time \par pt advised that if symptoms increase in severity to follow up sooner

## 2021-08-23 NOTE — PHYSICAL EXAM
[Normal] : no acute distress, well-nourished, well developed, well appearing [Thrill] : thrill [Pulsatile Thrill] : no pulsatile thrill [Aneurysm] : aneurysm [Bleeding] : no bleeding [Hand well perfused] : hand well perfused [Ulcer] : no ulcer [2+] : right 2+ [1+] : right 1+

## 2021-08-23 NOTE — HISTORY OF PRESENT ILLNESS
[FreeTextEntry1] : 39 yo male with history of cad s/p recent cabg, dm, htn, hld, esrd on hd presents for follow up of left upper extremity avf without any complaints.  pt has been using avf for access and denies any difficulty with hd.  pt denies any bleeding or difficult cannulation \par pt reports occasional pain over the right 5th digit \par pt states that it does not interfere with the function of the hand and is intermittent  [] : right radiocephalic fistula

## 2021-10-04 ENCOUNTER — APPOINTMENT (OUTPATIENT)
Dept: OPHTHALMOLOGY | Facility: CLINIC | Age: 41
End: 2021-10-04
Payer: MEDICARE

## 2021-10-04 ENCOUNTER — NON-APPOINTMENT (OUTPATIENT)
Age: 41
End: 2021-10-04

## 2021-10-04 PROCEDURE — 92286 ANT SGM IMG I&R SPECLR MIC: CPT

## 2021-10-04 PROCEDURE — 92133 CPTRZD OPH DX IMG PST SGM ON: CPT

## 2021-10-04 PROCEDURE — 92012 INTRM OPH EXAM EST PATIENT: CPT

## 2021-10-04 PROCEDURE — 92083 EXTENDED VISUAL FIELD XM: CPT

## 2021-10-15 NOTE — H&P ADULT - PROBLEM SELECTOR PLAN 5
- Hypertensive on admission; however, this is in the context of not taking home meds this am. Will restart home bp meds.  - Monitor BP. Hemostasis: Electrodesiccation

## 2021-12-20 ENCOUNTER — APPOINTMENT (OUTPATIENT)
Dept: VASCULAR SURGERY | Facility: CLINIC | Age: 41
End: 2021-12-20
Payer: MEDICARE

## 2021-12-20 PROCEDURE — 99213 OFFICE O/P EST LOW 20 MIN: CPT

## 2021-12-20 PROCEDURE — 93922 UPR/L XTREMITY ART 2 LEVELS: CPT

## 2021-12-20 PROCEDURE — 93990 DOPPLER FLOW TESTING: CPT

## 2021-12-20 PROCEDURE — 93990 DOPPLER FLOW TESTING: CPT | Mod: 59

## 2021-12-20 NOTE — DISCUSSION/SUMMARY
[FreeTextEntry1] : 42 yo male with history of cad s/p recent cabg, dm, htn, hld, esrd on hd presents for follow up of left upper extremity avf without any complaints. \par \par duplex shows patent avf without any stenosis with flow rate of 493 \par steal study shows no evidence of steal syndrome \par \par will continue to monitor given no issues with hd \par pt to follow up in 3-4 months with repeat duplex\par

## 2021-12-20 NOTE — HISTORY OF PRESENT ILLNESS
[] : right radiocephalic fistula [FreeTextEntry1] : 40 yo male with history of cad s/p recent cabg, dm, htn, hld, esrd on hd presents for follow up of left upper extremity avf without any complaints.  pt has been using avf for access and denies any difficulty with hd.  pt denies any bleeding or difficult cannulation \par pt denies any pain in the right upper extremity

## 2021-12-20 NOTE — PHYSICAL EXAM
[Thrill] : thrill [Aneurysm] : aneurysm [Hand well perfused] : hand well perfused [2+] : left 2+ [Pulsatile Thrill] : no pulsatile thrill [Bleeding] : no bleeding [Ulcer] : no ulcer [Normal] : normoactive bowel sounds, soft and nontender, no hepatosplenomegaly or masses appreciated [de-identified] :  strength 5/5 b/l upper extremities [de-identified] : intact

## 2022-01-24 ENCOUNTER — APPOINTMENT (OUTPATIENT)
Dept: OPHTHALMOLOGY | Facility: CLINIC | Age: 42
End: 2022-01-24
Payer: MEDICARE

## 2022-01-24 ENCOUNTER — NON-APPOINTMENT (OUTPATIENT)
Age: 42
End: 2022-01-24

## 2022-01-24 PROCEDURE — 92134 CPTRZ OPH DX IMG PST SGM RTA: CPT

## 2022-01-24 PROCEDURE — 92136 OPHTHALMIC BIOMETRY: CPT

## 2022-01-24 PROCEDURE — 92014 COMPRE OPH EXAM EST PT 1/>: CPT

## 2022-02-23 ENCOUNTER — NON-APPOINTMENT (OUTPATIENT)
Age: 42
End: 2022-02-23

## 2022-02-23 ENCOUNTER — APPOINTMENT (OUTPATIENT)
Dept: OPHTHALMOLOGY | Facility: EYE CENTER | Age: 42
End: 2022-02-23
Payer: MEDICARE

## 2022-02-23 PROCEDURE — 66982 XCAPSL CTRC RMVL CPLX WO ECP: CPT | Mod: RT

## 2022-02-24 ENCOUNTER — NON-APPOINTMENT (OUTPATIENT)
Age: 42
End: 2022-02-24

## 2022-02-24 ENCOUNTER — APPOINTMENT (OUTPATIENT)
Dept: OPHTHALMOLOGY | Facility: CLINIC | Age: 42
End: 2022-02-24
Payer: MEDICARE

## 2022-02-24 PROCEDURE — 99024 POSTOP FOLLOW-UP VISIT: CPT

## 2022-03-02 ENCOUNTER — NON-APPOINTMENT (OUTPATIENT)
Age: 42
End: 2022-03-02

## 2022-03-02 ENCOUNTER — APPOINTMENT (OUTPATIENT)
Dept: OPHTHALMOLOGY | Facility: CLINIC | Age: 42
End: 2022-03-02
Payer: MEDICARE

## 2022-03-02 PROCEDURE — 99024 POSTOP FOLLOW-UP VISIT: CPT

## 2022-03-30 ENCOUNTER — APPOINTMENT (OUTPATIENT)
Dept: OPHTHALMOLOGY | Facility: CLINIC | Age: 42
End: 2022-03-30
Payer: MEDICARE

## 2022-03-30 ENCOUNTER — NON-APPOINTMENT (OUTPATIENT)
Age: 42
End: 2022-03-30

## 2022-03-30 PROCEDURE — 99024 POSTOP FOLLOW-UP VISIT: CPT

## 2022-03-30 PROCEDURE — 92134 CPTRZ OPH DX IMG PST SGM RTA: CPT

## 2022-04-25 ENCOUNTER — APPOINTMENT (OUTPATIENT)
Dept: VASCULAR SURGERY | Facility: CLINIC | Age: 42
End: 2022-04-25
Payer: MEDICARE

## 2022-04-25 PROCEDURE — 99212 OFFICE O/P EST SF 10 MIN: CPT

## 2022-04-25 PROCEDURE — 93990 DOPPLER FLOW TESTING: CPT

## 2022-04-25 NOTE — PHYSICAL EXAM
[Thrill] : thrill [Pulsatile Thrill] : no pulsatile thrill [Aneurysm] : no aneurysm [Bleeding] : no bleeding [Hand well perfused] : hand well perfused [Ulcer] : no ulcer [Normal] : coordination grossly intact, no focal deficits [de-identified] : intact

## 2022-04-25 NOTE — HISTORY OF PRESENT ILLNESS
[FreeTextEntry1] : 42 yo male with history of cad s/p recent cabg, dm, htn, hld, esrd on hd presents for follow up of upper extremity avf without any complaints.  pt has been using avf for access and denies any difficulty with hd.  pt denies any bleeding or difficult cannulation \par pt denies any pain in the right upper extremity

## 2022-04-25 NOTE — DISCUSSION/SUMMARY
[FreeTextEntry1] : 42 yo male with history of esrd on hd presents for follow up of upper extremity avf\par duplex shows patent avf with flow rate of 691\par given no difficulty with hd will continue to monitor pt to follow up in 3 months with repeat duplex

## 2022-05-04 ENCOUNTER — APPOINTMENT (OUTPATIENT)
Dept: OPHTHALMOLOGY | Facility: CLINIC | Age: 42
End: 2022-05-04
Payer: MEDICARE

## 2022-05-04 ENCOUNTER — NON-APPOINTMENT (OUTPATIENT)
Age: 42
End: 2022-05-04

## 2022-05-04 PROCEDURE — 99024 POSTOP FOLLOW-UP VISIT: CPT

## 2022-08-10 ENCOUNTER — APPOINTMENT (OUTPATIENT)
Dept: OPHTHALMOLOGY | Facility: CLINIC | Age: 42
End: 2022-08-10

## 2022-08-10 ENCOUNTER — NON-APPOINTMENT (OUTPATIENT)
Age: 42
End: 2022-08-10

## 2022-08-10 PROCEDURE — 92286 ANT SGM IMG I&R SPECLR MIC: CPT

## 2022-08-10 PROCEDURE — 92134 CPTRZ OPH DX IMG PST SGM RTA: CPT

## 2022-08-10 PROCEDURE — 99214 OFFICE O/P EST MOD 30 MIN: CPT

## 2022-08-15 ENCOUNTER — APPOINTMENT (OUTPATIENT)
Dept: VASCULAR SURGERY | Facility: CLINIC | Age: 42
End: 2022-08-15

## 2022-08-15 VITALS
HEART RATE: 75 BPM | BODY MASS INDEX: 29.03 KG/M2 | WEIGHT: 176.37 LBS | SYSTOLIC BLOOD PRESSURE: 150 MMHG | DIASTOLIC BLOOD PRESSURE: 86 MMHG | HEIGHT: 65.5 IN

## 2022-08-15 PROCEDURE — 93990 DOPPLER FLOW TESTING: CPT

## 2022-08-15 PROCEDURE — 99214 OFFICE O/P EST MOD 30 MIN: CPT

## 2022-08-15 NOTE — PHYSICAL EXAM
[Thrill] : thrill [Pulsatile Thrill] : no pulsatile thrill [Aneurysm] : no aneurysm [Bleeding] : no bleeding [Hand well perfused] : hand well perfused [Ulcer] : no ulcer [Normal] : coordination grossly intact, no focal deficits [de-identified] : intact

## 2022-08-15 NOTE — DISCUSSION/SUMMARY
[FreeTextEntry1] : 42 yo male with history of esrd on hd presents for follow up of upper extremity avf\par \par given no difficulty with hd will continue to monitor pt to follow up in 3 months with repeat duplex

## 2022-08-15 NOTE — HISTORY OF PRESENT ILLNESS
[FreeTextEntry1] : 40 yo male with history of cad s/p recent cabg, dm, htn, hld, esrd on hd presents for follow up of upper extremity avf without any complaints.  pt has been using avf for access and denies any difficulty with hd.  pt denies any bleeding or difficult cannulation \par pt denies any pain in the right upper extremity

## 2022-12-05 ENCOUNTER — APPOINTMENT (OUTPATIENT)
Dept: VASCULAR SURGERY | Facility: CLINIC | Age: 42
End: 2022-12-05

## 2022-12-05 PROCEDURE — 93990 DOPPLER FLOW TESTING: CPT

## 2022-12-05 PROCEDURE — 99214 OFFICE O/P EST MOD 30 MIN: CPT

## 2022-12-05 NOTE — PHYSICAL EXAM
[Thrill] : thrill [Pulsatile Thrill] : no pulsatile thrill [Aneurysm] : no aneurysm [Bleeding] : no bleeding [Hand well perfused] : hand well perfused [Ulcer] : no ulcer [Normal] : coordination grossly intact, no focal deficits [de-identified] : intact

## 2022-12-05 NOTE — DISCUSSION/SUMMARY
[FreeTextEntry1] : 40 yo male with history of esrd on hd presents for follow up of upper extremity avf\par \par Patient has no significant stenosis but there is decreased flow.  Radial artery is heavily calcified and stenotic.  Suspect inflow disease.\par \par given no difficulty with hd will continue to monitor pt to follow up in 6 weeks with repeat duplex

## 2022-12-05 NOTE — REASON FOR VISIT
The patient is a 67y Female complaining of allergic reaction. [Follow-Up Visit] : a follow-up visit for

## 2023-01-09 ENCOUNTER — APPOINTMENT (OUTPATIENT)
Dept: VASCULAR SURGERY | Facility: CLINIC | Age: 43
End: 2023-01-09
Payer: MEDICARE

## 2023-01-09 VITALS
WEIGHT: 176 LBS | HEIGHT: 65.5 IN | BODY MASS INDEX: 28.97 KG/M2 | SYSTOLIC BLOOD PRESSURE: 147 MMHG | DIASTOLIC BLOOD PRESSURE: 78 MMHG | HEART RATE: 88 BPM

## 2023-01-09 PROCEDURE — 99214 OFFICE O/P EST MOD 30 MIN: CPT

## 2023-01-09 PROCEDURE — 93990 DOPPLER FLOW TESTING: CPT

## 2023-01-11 NOTE — PHYSICAL EXAM
[Thrill] : thrill [Pulsatile Thrill] : no pulsatile thrill [Aneurysm] : no aneurysm [Bleeding] : no bleeding [Hand well perfused] : hand well perfused [Ulcer] : no ulcer [Normal] : coordination grossly intact, no focal deficits [de-identified] : intact

## 2023-01-11 NOTE — DISCUSSION/SUMMARY
[FreeTextEntry1] : 42 yo male with history of esrd on hd presents for follow up of upper extremity avf\par \par Patient has no significant stenosis but there is decreased flow.  Radial artery is heavily calcified and stenotic.  Suspect inflow disease.\par \par given no difficulty with hd will continue to monitor pt to follow up in 10 weeks with repeat duplex

## 2023-02-15 ENCOUNTER — NON-APPOINTMENT (OUTPATIENT)
Age: 43
End: 2023-02-15

## 2023-02-15 ENCOUNTER — APPOINTMENT (OUTPATIENT)
Dept: OPHTHALMOLOGY | Facility: CLINIC | Age: 43
End: 2023-02-15
Payer: MEDICARE

## 2023-02-15 PROCEDURE — 92133 CPTRZD OPH DX IMG PST SGM ON: CPT

## 2023-02-15 PROCEDURE — 92012 INTRM OPH EXAM EST PATIENT: CPT

## 2023-02-15 PROCEDURE — 92083 EXTENDED VISUAL FIELD XM: CPT

## 2023-03-21 NOTE — H&P PST ADULT - GIT GEN HX ROS MEA POS PC
You can access the FollowMyHealth Patient Portal offered by [facility] by registering at the following website: [email or web address]. By joining [employer]’s FollowMyHealth portal, you will also be able to view your health information using other applications (apps) compatible with our system.
constipation

## 2023-04-24 ENCOUNTER — APPOINTMENT (OUTPATIENT)
Dept: VASCULAR SURGERY | Facility: CLINIC | Age: 43
End: 2023-04-24

## 2023-05-17 ENCOUNTER — NON-APPOINTMENT (OUTPATIENT)
Age: 43
End: 2023-05-17

## 2023-05-22 ENCOUNTER — APPOINTMENT (OUTPATIENT)
Dept: VASCULAR SURGERY | Facility: CLINIC | Age: 43
End: 2023-05-22
Payer: MEDICARE

## 2023-05-22 DIAGNOSIS — T82.898A OTHER SPECIFIED COMPLICATION OF VASCULAR PROSTHETIC DEVICES, IMPLANTS AND GRAFTS, INITIAL ENCOUNTER: ICD-10-CM

## 2023-05-22 DIAGNOSIS — N18.6 END STAGE RENAL DISEASE: ICD-10-CM

## 2023-05-22 DIAGNOSIS — Z99.2 END STAGE RENAL DISEASE: ICD-10-CM

## 2023-05-22 PROCEDURE — 93990 DOPPLER FLOW TESTING: CPT

## 2023-05-22 PROCEDURE — 99212 OFFICE O/P EST SF 10 MIN: CPT

## 2023-05-22 NOTE — REASON FOR VISIT
MEDICAL ELIGIBILITY FORM    Name: Franklyn Ely YOB: 2007     Franklyn is Medically eligible for all sports without restriction    Recommendations: N/A    I have examined the student named on this form and completed the preparticipation physical evaluation. The athlete does not have apparent clinical contraindications to practice and can participate in the sport(s) as outlined on this form. A copy of the physical examination findings are on record in my office and can be made available to the school at the request of the parents. If conditions arise after the athlete has been cleared for participation, the physician may rescind the medical eligibility until the problem is resolved and the potential consequences are completely explained to the athlete (and parents or guardians).    Name of health care professional (print or type): Wendy Mclaughlin DO Date: 10/5/2022     Address: [address] [address]  Dept: [phone]     Signature of health care professional: _______________________________________      SHARED EMERGENCY INFORMATION    No Known Allergies    Current Outpatient Medications   Medication Instructions   • clindamycin-benzoyl peroxide (BENZACLIN) 1-5 % gel APPLY TOPICALLY TO THE AFFECTED AREA TWICE DAILY   • mupirocin (BACTROBAN) 2 % ointment APPLY TOPICALLY TO THE AFFECTED AREA THREE TIMES DAILY       Other information:_____________________________________________________________________  _____________________________________________________________________________________  _____________________________________________________________________________________    Emergency contacts:___________________________________________________________________  _____________________________________________________________________________________  _____________________________________________________________________________________     © 2019  American Academy of Family Physicians, American Academy of Pediatrics, American College of Sport Medicine, American Medical Society for Sports Medicine, American Orthopaedics Society for Sport Medicine, and American Osteopathic Academy of Sports Medicine.  Permission is granted to reprint for noncommercial, educational purposes with acknowledgement.      PHYSICAL EXAMINATION FORM     Name: Franklyn Ely YOB: 2007   PHYSICIAN REMINDERS  1. Consider additional questions on more-sensitive issues.  · Do you feel stressed out or under a lot of pressure?  · Do you feel sad, hopeless, depressed or anxious?  · Do you feel safe at your home or residence?  · During the past 30 days, did you use chewing tobacco, snuff or dip?  · Do you drink alcohol or user any other drugs?  · Have you even taken anabolic steroids or used any other performance-enhancing supplement?  · Have you even take any supplements to help you gain or lose weight or improve your performance?  · Do you wear a seat belt, use a helmet, and use condoms?  2.  Consider reviewing questions on cardiovascular symptoms (Q4-Q13 of History Form).    EXAMINATION     Vitals: BP (!) 112/59   Pulse 86   Temp 98.7 °F (37.1 °C)   Resp 19   Ht 5' 4.76\" (1.645 m)   Wt 82.6 kg (181 lb 15.8 oz)   LMP 09/14/2022   BMI 30.51 kg/m²   BSA 1.9 m²    Vision: R 20/               L 20/                      Corrected  Y         N     MEDICAL NORMAL ABNORMAL FINDINGS   Appearance  · Marfan stigmata (kyphoscoliosis, high-arched palate, pectus excavatum, arachnodactyly, arm span > height, hyperlaxity, myopia, MVP, aortic insufficiency) Yes    Eyes, ears, nose, throat  · Pupils equal  · Hearing Yes    Lymph nodes Yes    Heart*  · Murmurs (auscultation standing, auscultation supine, +/- Valsalva maneuver) Yes    Lungs Yes    Abdomen Yes    Skin  · Herpes simplex virus (HSV), lesions suggestive of methicillin-resistant Staphylococcus aureus MRSA, or tinea corporis Yes     MUSCULOSKELETAL NORMAL ABNORMAL FINDINGS   Neck Yes    Back Yes    Shoulder and arm Yes    Elbow and forearm Yes    Wrist, hand, and fingers Yes    Hip and thigh Yes    Knee Yes    Leg and ankle Yes    Foot and toes Yes    Functional  · Double-leg squat test, single-leg squat test, and box drop or step drop test Yes       * Consider electrocardiography ECG, echocardiogram, referral to cardiology for abnormal cardiac history or examination finding, or a combination of those.  Name of health care professional (print or type): Wendy Mclaughlin DO  Date: 10/5/2022  Address: [address] [address]  Dept: [phone]   Signature of health care professional: _______________________________________    © 2019 American Academy of Family Physicians, American Academy of Pediatrics, American College of Sport Medicine, American Medical Society for Sports Medicine, American Orthopaedics Society for Sport Medicine, and American Osteopathic Academy of Sports Medicine.  Permission is granted to reprint for noncommercial, educational purposes with acknowledgement.         Name: Franklyn Ely YOB: 2007   Supplemental COVID-19 questions     1.  Have you had any of the following symptoms in the past 14 days?           a) Fever or chills Yes  /  No          b) Cough Yes  /  No          c) Shortness of breath or difficulty breathing Yes  /  No          d) Fatigue Yes  /  No          e) Muscle or body aches Yes  /  No          f) Headache Yes  /  No          g) New loss of taste or smell Yes  /  No          h) Sore throat Yes  /  No          i) Congestion or runny nose Yes  /  No          j) Nausea or vomiting Yes  /  No          k) Diarrhea Yes  /  No          l) Date symptoms started _______          m) Date symptoms resolved _______   2.  Have you ever had a positive test for COVID-19? Yes  /  No          If yes _______                 i.  Date of test Yes   /  No                 ii. Were you tested because you had symptoms? Yes  /  No                 If yes:                         a) Date symptoms started _______                        b) Date symptoms resolved _______                        c) Were you hospitalized? Yes  /  No                        d) Did you have fever > 100.4 F.? Yes  /  No                               If yes, how many days did your fever last? _______                        e) Did you have muscle aches, chills, or lethargy? Yes  /  No                               If yes, how many days did these symptoms last? _______                        f) Have you had the vaccine? Yes  /  No                 iii. Were you tested because you were exposed to someone with COVID-19, but you did not have                     any symptoms? Yes  /  No   3. Has anyone living in your household had any of the following symptoms or tested positive for COVID-19 in the past 14 days? Yes  /  No          If Yes, [place] the applicable symptoms.     • Fever or chills • Shortness of breath or difficulty breathing    • Muscle or body aches • New loss of taste or smell    • Nausea or vomiting • Congestion or runny nose    • Sore throat           • Headache           • Cough • Fatigue           • Diarrhea       4. Have you been within 6 feet for more than 15 minutes of someone with COVID-19 in the past 14 days? Yes  /  No          If yes: date(s) of exposure _______   5. Are you currently waiting on results from a recent COVID test? Yes  /  No     Sources:  • Interim Guidance on the Preparticipation Physical Examination... : Clinical Journal of Sport Medicine (lww.com)  • Supplemental COVID19 Questions (lww.com)  • COVID19 Interim Guidance: Return to Sports and Physical Activity (aap.org)      HISTORY FORM  Note: Complete and sign this form (with your parents if younger than 18) before your appointment.  Name: Franklyn Ely YOB: 2007     Date of examination:  10/5/2022  Sport(s):_________________________________________   Sex assigned at birth: (F, M, or other): female How do you identify your gender?(F, M, or other):_________     List past and current medical conditions:____________________________________________________________________  _______________________________________________________________________________________________________________    Have you ever had surgery? If yes, list all past surgical procedures: ______________________________________________  _______________________________________________________________________________________________________________    Medicines and supplements: List all current prescriptions, over-the-counter medicines, and supplements (herbal and nutritional):   ______________________________________________________________________________________________________________  ______________________________________________________________________________________________________________    Do you have any allergies? If yes, please list all your allergies (ie, medicines, pollens, food, stinging insects).   ______________________________________________________________________________________________________________  ______________________________________________________________________________________________________________       Patient Health Questionnaire Version 4 (PHQ-4)  Over the last 2 weeks, how often have you been bothered by any of the following problems? ([place] response.)   Not at all Several days Over half the days Nearly every day   Feeling nervous, anxious, or on edge 0 1 2 3   Not being able to stop or control worrying 0 1 2 3   Little interest or pleasure in doing things 0 1 2 3   Feeling down, depressed, or hopeless 0 1 2 3   (A sum of ?3 is considered positive on either subscale [questions 1 and 2, or questions 3 and 4] for screening purposes.)     GENERAL QUESTIONS  (Explain “Yes” answers at the end of this  form.  [place] questions if you don’t know the answer.)     Yes     No   1. Do you have any concerns that you would like to discuss with your provider?       2. Has a provider ever denied or restricted your participation in sports for any reason?       3. Do you have any ongoing medical issues or recent illness?       HEART HEALTH QUESTIONS ABOUT YOU Yes No   4. Have you ever passed out or nearly passed out during or after exercise?       5. Have you ever had discomfort, pain, tightness, or pressure in your chest during exercise?       6. Does your heart ever race, flutter in your chest, or skip beats (irregular beats) during exercise?       7. Has a doctor ever told you that you have any heart problems?       8. Has a doctor ever requested a test for your heart? For example, electrocardiography (ECG) or echocardiography.      HEART HEALTH QUESTIONS ABOUT YOU (CONTINUED ) Yes No   9. Do you get light-headed or feel shorter of breath than your friends during exercise?       10. Have you ever had a seizure?       HEART HEALTH QUESTIONS ABOUT YOUR FAMILY Yes No   11. Has any family member or relative  of heart problems or had an unexpected or unexplained sudden death before age 35 years (including drowning or unexplained car crash)?       12. Does anyone in your family have a genetic heart problem such as hypertrophic cardiomyopathy (HCM), Marfan syndrome, arrhythmogenic right ventricular cardiomyopathy (ARVC), long QT syndrome (LQTS), short QT syndrome (SQTS), Brugada syndrome, or catecholaminergic polymorphic ventricular tachycardia (CPVT)?       13. Has anyone in your family had a pacemaker or an implanted defibrillator before age 35?                Name: Franklyn Ely YOB: 2007     BONE AND JOINT QUESTIONS Yes No   14. Have you ever had a stress fracture or an injury to a bone, muscle, ligament, joint, or tendon that caused you to miss a practice or game?       15. Do you have a bone, muscle,  ligament, or joint injury that bothers you?       MEDICAL QUESTIONS Yes No   16. Do you cough, wheeze, or have difficulty breathing during or after exercise?       17. Are you missing a kidney, an eye, a testicle (males), your spleen, or any other organ?       18. Do you have groin or testicle pain or a painful bulge or hernia in the groin area?       19. Do you have any recurring skin rashes or rashes that come and go, including herpes or methicillin-resistant Staphylococcus aureus  (MRSA)?       20. Have you had a concussion or head injury that caused confusion, a prolonged headache, or memory problems?       21. Have you ever had numbness, had tingling, had weakness in your arms or legs, or been unable to move your arms or legs after being hit or falling?       22. Have you ever become ill while exercising in the heat?       23. Do you or does someone in your family have sickle cell trait or disease?       24. Have you ever had or do you have any problems with your eyes or vision?        MEDICAL QUESTIONS (CONTINUED ) Yes No   25. Do you worry about your weight?         26. Are you trying to or has anyone recommended that you gain or lose weight?       27. Are you on a special diet or do you avoid certain types of foods or food groups?       28. Have you ever had an eating disorder?       FEMALES ONLY     29. Have you ever had a menstrual period?       30. How old were you when you had your first menstrual period?       31. When was your most recent menstrual period?       32. How many periods have you had in the past 12 months?         Explain \"Yes\" answers  here.  ______________________________________________    ______________________________________________    ______________________________________________    ______________________________________________    ______________________________________________    ______________________________________________    ______________________________________________    ______________________________________________     I hereby state that, to the best of my knowledge, my answers to the questions on this form are complete and correct.    Signature of athlete: ____________________________________________________________________________________    Signature of parent or guardian: ___________________________________________________________________________  Date: ________________________________________________  _____________________________________________________________________________________________________  © 2019 American Academy of Family Physicians, American Academy of Pediatrics, American College of Sports Medicine, American Medical Society for Sports Medicine, American Orthopaedic Society for Sports Medicine, and American Osteopathic Academy of Sports Medicine. Permission is granted to reprint for noncommercial, educational purposes with acknowledgment.   [Follow-Up Visit] : a follow-up visit for [FreeTextEntry2] : Right upper extremity AV fistula

## 2023-05-22 NOTE — PHYSICAL EXAM
[Thrill] : thrill [Aneurysm] : aneurysm [Hand well perfused] : hand well perfused [Normal] : coordination grossly intact, no focal deficits [Pulsatile Thrill] : no pulsatile thrill [Bleeding] : no bleeding [Ulcer] : no ulcer [de-identified] : intact

## 2023-05-22 NOTE — DISCUSSION/SUMMARY
[FreeTextEntry1] : 42-year-old male with ESRD currently on hemodialysis via right upper extremity radiocephalic AV fistula\par \par In the office today, patient underwent duplex which demonstrated no evidence of stenosis with adequate flow.\par \par Given no difficulties with hemodialysis at this time, we will continue to monitor.\par \par Follow-up 3 months with repeat duplex

## 2023-05-22 NOTE — HISTORY OF PRESENT ILLNESS
[FreeTextEntry1] : Patient is a 42-year-old male with history significant for coronary artery disease status post CABG, DM, hypertension, hyperlipidemia, ESRD currently on hemodialysis via right upper extremity AV fistula presents to the office for routine follow-up.  Patient denies any issues with hemodialysis at this time.

## 2023-07-24 ENCOUNTER — NON-APPOINTMENT (OUTPATIENT)
Age: 43
End: 2023-07-24

## 2023-07-24 ENCOUNTER — APPOINTMENT (OUTPATIENT)
Dept: OPHTHALMOLOGY | Facility: CLINIC | Age: 43
End: 2023-07-24
Payer: MEDICARE

## 2023-07-24 PROCEDURE — 92286 ANT SGM IMG I&R SPECLR MIC: CPT

## 2023-07-24 PROCEDURE — 99214 OFFICE O/P EST MOD 30 MIN: CPT

## 2023-07-24 PROCEDURE — 92201 OPSCPY EXTND RTA DRAW UNI/BI: CPT

## 2023-10-09 ENCOUNTER — APPOINTMENT (OUTPATIENT)
Dept: VASCULAR SURGERY | Facility: CLINIC | Age: 43
End: 2023-10-09

## 2023-11-27 NOTE — H&P PST ADULT - GASTROINTESTINAL DETAILS
Patient is calling and requesting a refill of:    amLODIPine-benazepril (LOTREL) 10-20 MG per capsule         Next Office Visit 2/16/24 bowel sounds normal/soft/no distention/normal/no guarding/no bruit/no rebound tenderness/no masses palpable/nontender

## 2024-01-29 ENCOUNTER — APPOINTMENT (OUTPATIENT)
Dept: OPHTHALMOLOGY | Facility: CLINIC | Age: 44
End: 2024-01-29
Payer: MEDICARE

## 2024-01-29 ENCOUNTER — NON-APPOINTMENT (OUTPATIENT)
Age: 44
End: 2024-01-29

## 2024-01-29 PROCEDURE — 92012 INTRM OPH EXAM EST PATIENT: CPT

## 2024-06-03 ENCOUNTER — APPOINTMENT (OUTPATIENT)
Dept: VASCULAR SURGERY | Facility: CLINIC | Age: 44
End: 2024-06-03
Payer: MEDICARE

## 2024-06-03 PROCEDURE — 93990 DOPPLER FLOW TESTING: CPT

## 2024-08-15 ENCOUNTER — APPOINTMENT (OUTPATIENT)
Dept: OPHTHALMOLOGY | Facility: CLINIC | Age: 44
End: 2024-08-15
Payer: MEDICARE

## 2024-08-15 ENCOUNTER — NON-APPOINTMENT (OUTPATIENT)
Age: 44
End: 2024-08-15

## 2024-08-15 PROCEDURE — 92083 EXTENDED VISUAL FIELD XM: CPT

## 2024-08-15 PROCEDURE — 92133 CPTRZD OPH DX IMG PST SGM ON: CPT

## 2024-08-15 PROCEDURE — 92014 COMPRE OPH EXAM EST PT 1/>: CPT

## 2024-09-16 ENCOUNTER — APPOINTMENT (OUTPATIENT)
Dept: VASCULAR SURGERY | Facility: CLINIC | Age: 44
End: 2024-09-16

## 2024-11-10 NOTE — H&P ADULT - NSICDXPASTSURGICALHX_GEN_ALL_CORE_FT
PAST SURGICAL HISTORY:  Detached retina, left repair of retina    History of left cataract extraction     Renal stone insertion of left ureteral stone    
risk factors

## 2024-11-11 ENCOUNTER — APPOINTMENT (OUTPATIENT)
Dept: VASCULAR SURGERY | Facility: CLINIC | Age: 44
End: 2024-11-11

## 2025-01-09 ENCOUNTER — APPOINTMENT (OUTPATIENT)
Dept: VASCULAR SURGERY | Facility: CLINIC | Age: 45
End: 2025-01-09
Payer: MEDICARE

## 2025-01-09 PROCEDURE — 93990 DOPPLER FLOW TESTING: CPT

## 2025-02-18 ENCOUNTER — APPOINTMENT (OUTPATIENT)
Dept: OPHTHALMOLOGY | Facility: CLINIC | Age: 45
End: 2025-02-18
Payer: MEDICARE

## 2025-02-18 ENCOUNTER — NON-APPOINTMENT (OUTPATIENT)
Age: 45
End: 2025-02-18

## 2025-02-18 PROCEDURE — 92012 INTRM OPH EXAM EST PATIENT: CPT

## 2025-02-18 PROCEDURE — 92250 FUNDUS PHOTOGRAPHY W/I&R: CPT

## 2025-04-02 PROBLEM — E11.311 DIABETIC MACULAR EDEMA: Status: RESOLVED | Noted: 2018-07-30 | Resolved: 2025-04-02

## 2025-08-19 ENCOUNTER — NON-APPOINTMENT (OUTPATIENT)
Age: 45
End: 2025-08-19

## 2025-08-19 ENCOUNTER — APPOINTMENT (OUTPATIENT)
Dept: OPHTHALMOLOGY | Facility: CLINIC | Age: 45
End: 2025-08-19
Payer: MEDICARE

## 2025-08-19 PROCEDURE — 92083 EXTENDED VISUAL FIELD XM: CPT

## 2025-08-19 PROCEDURE — 92014 COMPRE OPH EXAM EST PT 1/>: CPT

## 2025-08-19 PROCEDURE — 92133 CPTRZD OPH DX IMG PST SGM ON: CPT
